# Patient Record
Sex: FEMALE | Race: WHITE | URBAN - METROPOLITAN AREA
[De-identification: names, ages, dates, MRNs, and addresses within clinical notes are randomized per-mention and may not be internally consistent; named-entity substitution may affect disease eponyms.]

---

## 2017-03-04 ENCOUNTER — OFFICE VISIT (OUTPATIENT)
Dept: URGENT CARE | Facility: URGENT CARE | Age: 77
End: 2017-03-04
Payer: COMMERCIAL

## 2017-03-04 VITALS
WEIGHT: 175.6 LBS | TEMPERATURE: 98.1 F | OXYGEN SATURATION: 91 % | BODY MASS INDEX: 36.39 KG/M2 | DIASTOLIC BLOOD PRESSURE: 90 MMHG | HEART RATE: 100 BPM | SYSTOLIC BLOOD PRESSURE: 141 MMHG

## 2017-03-04 DIAGNOSIS — M79.10 MYALGIA: Primary | ICD-10-CM

## 2017-03-04 DIAGNOSIS — J02.9 SORETHROAT: ICD-10-CM

## 2017-03-04 LAB
DEPRECATED S PYO AG THROAT QL EIA: NORMAL
FLUAV+FLUBV AG SPEC QL: ABNORMAL
FLUAV+FLUBV AG SPEC QL: NEGATIVE
MICRO REPORT STATUS: NORMAL
SPECIMEN SOURCE: ABNORMAL
SPECIMEN SOURCE: NORMAL

## 2017-03-04 PROCEDURE — 87804 INFLUENZA ASSAY W/OPTIC: CPT | Performed by: FAMILY MEDICINE

## 2017-03-04 PROCEDURE — 87081 CULTURE SCREEN ONLY: CPT | Performed by: FAMILY MEDICINE

## 2017-03-04 PROCEDURE — 87880 STREP A ASSAY W/OPTIC: CPT | Performed by: FAMILY MEDICINE

## 2017-03-04 PROCEDURE — 99202 OFFICE O/P NEW SF 15 MIN: CPT | Performed by: FAMILY MEDICINE

## 2017-03-04 RX ORDER — OSELTAMIVIR PHOSPHATE 75 MG/1
75 CAPSULE ORAL 2 TIMES DAILY
Qty: 10 CAPSULE | Refills: 0 | Status: SHIPPED | OUTPATIENT
Start: 2017-03-04 | End: 2017-04-14

## 2017-03-04 NOTE — MR AVS SNAPSHOT
After Visit Summary   3/4/2017    Alba Phillips    MRN: [id number]           Patient Information     Date Of Birth          1940        Visit Information        Provider Department      3/4/2017 11:30 AM Syed Vargas MD [facility]        Today's Diagnoses     Myalgia    -  1    Sorethroat           Follow-ups after your visit        Who to contact     If you have questions or need follow up information about today's clinic visit or your schedule please contact [facility] directly at [phone].  Normal or non-critical lab and imaging results will be communicated to you by "[employer]"hart, letter or phone within 4 business days after the clinic has received the results. If you do not hear from us within 7 days, please contact the clinic through Fastlyt or phone. If you have a critical or abnormal lab result, we will notify you by phone as soon as possible.  Submit refill requests through Amadesa or call your pharmacy and they will forward the refill request to us. Please allow 3 business days for your refill to be completed.          Additional Information About Your Visit        MyChart Information     Amadesa gives you secure access to your electronic health record. If you see a primary care provider, you can also send messages to your care team and make appointments. If you have questions, please call your primary care clinic.  If you do not have a primary care provider, please call [phone] and they will assist you.        Care EveryWhere ID     This is your Care EveryWhere ID. This could be used by other organizations to access your [place] medical records  [id number]        Your Vitals Were     Pulse Temperature Pulse Oximetry BMI (Body Mass Index)          100 98.1  F (36.7  C) (Oral) 91% 36.39 kg/m2         Blood Pressure from Last 3 Encounters:   03/04/17 141/90   05/11/11 138/84   03/17/11 134/82    Weight from Last 3 Encounters:   03/04/17  175 lb 9.6 oz (79.7 kg)   05/11/11 185 lb (83.9 kg)   03/17/11 181 lb (82.1 kg)              We Performed the Following     Beta strep group A culture     Influenza A/B antigen     Rapid strep screen          Today's Medication Changes          These changes are accurate as of: 3/4/17 11:59 PM.  If you have any questions, ask your nurse or doctor.               Start taking these medicines.        Dose/Directions    * oseltamivir 75 MG capsule   Commonly known as:  TAMIFLU   Used for:  Myalgia, Sorethroat   Started by:  Syed Vargas MD        Dose:  75 mg   Take 1 capsule (75 mg) by mouth 2 times daily   Quantity:  10 capsule   Refills:  0       * oseltamivir 75 MG capsule   Commonly known as:  TAMIFLU   Used for:  Myalgia, Sorethroat   Started by:  Syed Vargas MD        Dose:  75 mg   Take 1 capsule (75 mg) by mouth 2 times daily   Quantity:  10 capsule   Refills:  0       * Notice:  This list has 2 medication(s) that are the same as other medications prescribed for you. Read the directions carefully, and ask your doctor or other care provider to review them with you.         Where to get your medicines      These medications were sent to [facility] [id number] [place] [address]  [address] [facility] [address]     Phone:  [phone]     oseltamivir 75 MG capsule         Some of these will need a paper prescription and others can be bought over the counter.  Ask your nurse if you have questions.     Bring a paper prescription for each of these medications     oseltamivir 75 MG capsule                Primary Care Provider Office Phone # Fax #    Daina Desouza [phone]       [address] [address]        Thank you!     Thank you for choosing [facility]  for your care. Our goal is always to provide you with excellent care. Hearing back from our patients is one way we can continue to improve our services.  Please take a few minutes to complete the written survey that you may receive in the mail after your visit with us. Thank you!             Your Updated Medication List - Protect others around you: Learn how to safely use, store and throw away your medicines at www.disposemymeds.org.          This list is accurate as of: 3/4/17 11:59 PM.  Always use your most recent med list.                   Brand Name Dispense Instructions for use    aspirin 81 MG tablet      Take 1 tablet by mouth 3 times daily as needed.       fluticasone 50 MCG/ACT spray    FLONASE    1 Package    2 sprays by Both Nostrils route daily.       * oseltamivir 75 MG capsule    TAMIFLU    10 capsule    Take 1 capsule (75 mg) by mouth 2 times daily       * oseltamivir 75 MG capsule    TAMIFLU    10 capsule    Take 1 capsule (75 mg) by mouth 2 times daily       * Notice:  This list has 2 medication(s) that are the same as other medications prescribed for you. Read the directions carefully, and ask your doctor or other care provider to review them with you.

## 2017-03-04 NOTE — PROGRESS NOTES
SUBJECTIVE:                                                    Alba Phillips is a 76 year old female who presents to clinic today for the following health issues:      RESPIRATORY SYMPTOMS      Duration: x3 days    Description  rhinorrhea, sore throat, cough and headache    Severity: severe    Accompanying signs and symptoms: None    History (predisposing factors):  asthma    Precipitating or alleviating factors: None    Therapies tried and outcome:  guaifenesin          OBJECTIVE:  She appears well, vital signs are as noted by the nurse. Ears normal.  Throat and pharynx normal.  Neck supple. No adenopathy in the neck. Nose is congested. Sinuses non tender. The chest is clear, without wheezes or rales.    ASSESSMENT:   Influenza    PLAN:  Symptomatic therapy suggested: push fluids and rest. Call or return to clinic prn if these symptoms worsen or fail to improve as anticipated.

## 2017-03-04 NOTE — NURSING NOTE
"Chief Complaint   Patient presents with     URI     Flu Sx, Congestion, Nasal, HA x3 days, Bodyache, sore throat       Initial /90 (BP Location: Right arm, Patient Position: Chair, Cuff Size: Adult Regular)  Pulse 100  Temp 98.1  F (36.7  C) (Oral)  Wt 175 lb 9.6 oz (79.7 kg)  SpO2 91%  BMI 36.39 kg/m2 Estimated body mass index is 36.39 kg/(m^2) as calculated from the following:    Height as of 5/11/11: 4' 10.25\" (1.48 m).    Weight as of this encounter: 175 lb 9.6 oz (79.7 kg).  Medication Reconciliation: complete     Maggy Thomas CMA (AAMA)        "

## 2017-03-06 LAB
BACTERIA SPEC CULT: NORMAL
MICRO REPORT STATUS: NORMAL
SPECIMEN SOURCE: NORMAL

## 2017-03-20 ENCOUNTER — EXTERNAL ORDER RESULTS (OUTPATIENT)
Dept: SURGERY | Facility: CLINIC | Age: 77
End: 2017-03-20

## 2017-03-30 ENCOUNTER — OFFICE VISIT (OUTPATIENT)
Dept: SURGERY | Facility: CLINIC | Age: 77
End: 2017-03-30
Payer: COMMERCIAL

## 2017-03-30 VITALS
DIASTOLIC BLOOD PRESSURE: 90 MMHG | BODY MASS INDEX: 33.38 KG/M2 | TEMPERATURE: 100 F | OXYGEN SATURATION: 92 % | HEIGHT: 60 IN | WEIGHT: 170 LBS | SYSTOLIC BLOOD PRESSURE: 154 MMHG

## 2017-03-30 DIAGNOSIS — K43.2 INCISIONAL HERNIA, WITHOUT OBSTRUCTION OR GANGRENE: Primary | ICD-10-CM

## 2017-03-30 PROCEDURE — 99203 OFFICE O/P NEW LOW 30 MIN: CPT | Performed by: SURGERY

## 2017-03-30 ASSESSMENT — ENCOUNTER SYMPTOMS
COUGH: 1
CONSTIPATION: 1
ABDOMINAL PAIN: 1

## 2017-03-30 NOTE — LETTER
2017    RE:  Alba Chowdaryer-:  [birth year]    Alba is a 76 year old White female who presents for hernia evaluation. The patient has noticed a bulge. Pain has been present. Symptoms began 3 years weeks ago. Symptoms are described as aching and pressure located in the periumbilical region. Associated with this is none. Pt has had previous ABD surgery including NOBLE with BSO. Patient does report that increased activity/lifting causes pain. Employment does not require lifting.  Her original surgery was an abdominal hysterectomy. She had a hernia repair at this site about three years ago. Review of the operative note reports a closure with sutures of a 6 cm defect, no mesh was used.     ConstipationNo  DysuriaNo  CoughYes, chronic cough due to chronic sinus problems  SmokingNo  DiabetesNo     Pt's chart has been reviewed for PMH, PSH, allergies, medications and social history.     ROS:  Pulm: No shortness of breath, dyspnea on exertion, cough, or hemoptysis  CV: negative  ABD: See chief complaint  : negative     Physical exam:   Body mass index is 33.2 kg/(m^2).  Patient able to get up on table with mild difficulty.  Head eyes, nose and mouth within normal limits.  Sclera are clear  No supraclavicular or cervical adenopathy noted.  Neck shows no gross mass  Respirations are regular and non labored  Abdomen is abdomen is soft without significant tenderness, masses, organomegaly or guarding bowel sounds are positive and no caput medusa noted.  Hernia is noted in the lower epigastrium beginning at the level of the umbilicus and extending cephalad several centimeters. There is a clearly defined right muscular border, on the left, its less well-defined. It is moderately tender and appears to be at least partially reducible.     Imaging CT Yes, imaging review shows multiple defect hernia with the largest defect being slightly under 10 cm in width bowel is involved in the hernia. There is no inflammation. I  reviewed the scan with the patient.        Assesment: upper ventral wall incisional hernia, recurrent      Plan: Discussed observation, external support, possible progression, incarceration and strangulation signs and symptoms and need for immediate treatment if they develop.  Discussed surgery in detail, including risk, benefits, complications, incision/cosmetics, mesh, infection (possibly requiring removal of the mesh), chronic pain, involvement of inta-abdominal organs, lifting and activity limits after surgery. Gave literature to review.  We also discussed twice with a retro-muscular mesh. We discussed the possible need for component separation however, based on her CT scan, lateral musculature appears lax enough that primary closure could be achieved without component separation. I did however discuss this potential option with her. She will be taking care of her sinus infection and dental problem prior to undergoing hernia repair. She lives in [place] but has a daughter who lives in town here in the [place] area Will schedule surgery in near future     Chuy Linder MD

## 2017-03-30 NOTE — PROGRESS NOTES
Alba is a 76 year old White female who presents for hernia evaluation. The patient has noticed a bulge. Pain has been present.  Symptoms began 3 years weeks ago.  Symptoms are described as aching and pressure  located in the periumbilical region.  Associated with this  is none.  Pt has had previous ABD surgery including NOBLE with BSO.  Patient does report that increased activity/lifting causes pain. Employment does not require lifting.  Her original surgery was an abdominal hysterectomy.  She had a hernia repair at this site about three years ago.  Review of the operative note reports a closure with sutures of a 6 cm defect, no mesh was used.    ConstipationNo  DysuriaNo  CoughYes, chronic cough due to chronic sinus problems  SmokingNo  DiabetesNo    Pt's chart has been reviewed for PMH, PSH, allergies, medications and social history.    ROS:  Pulm:  No shortness of breath, dyspnea on exertion, cough, or hemoptysis  CV:  negative  ABD:  See chief complaint  :  negative    Physical exam:   Body mass index is 33.2 kg/(m^2).  Patient able to get up on table with mild difficulty.  Head eyes, nose and mouth within normal limits.  Sclera are clear  No supraclavicular or cervical adenopathy noted.  Neck shows no gross mass  Respirations are regular and non labored  Abdomen is abdomen is soft without significant tenderness, masses, organomegaly or guarding  bowel sounds are positive and no caput medusa noted.  Hernia  is noted in the lower epigastrium beginning at the level of the umbilicus and extending cephalad several centimeters.  There is a clearly defined right muscular border, on the left, its less well-defined.  It is moderately tender and appears to be at least partially reducible.    Imaging  CT Yes, imaging review shows multiple defect hernia with the largest defect being slightly under 10 cm in width bowel is involved in the hernia.  There is no inflammation.  I reviewed the scan with the  patient.      Assesment: upper ventral wall incisional hernia, recurrent     Plan: Discussed observation, external support, possible progression, incarceration and strangulation signs and symptoms and need for immediate treatment if they develop.  Discussed surgery in detail, including risk, benefits, complications, incision/cosmetics, mesh, infection (possibly requiring removal of the mesh), chronic pain, involvement of inta-abdominal organs, lifting and activity limits after surgery. Gave literature to review.  We also discussed twice with a retro-muscular mesh.  We discussed the possible need for component separation however, based on her CT scan, lateral musculature appears lax enough that primary closure could be achieved without component separation.  I did however discuss this potential option with her.  She will be taking care of her sinus infection and dental problem prior to undergoing hernia repair.  She lives in [place] but has a daughter who lives in town here in the [place] area Will schedule surgery in near future  Time spent with the patient with greater that 50% of the time in discussion was 30 minutes.    Chuy Linder MD  3/30/2017 4:07 PM    Please route or send letter to:  Primary Care Provider (PCP) and Include Progress Note

## 2017-03-30 NOTE — PROGRESS NOTES
HPI      ROS (Review of Systems):     Respiratory: Positive for cough, asthma and dyspnea.    Cardiovascular: Positive for heart disease, hypertension and hyperlipidemia.   GASTROINTESTINAL: Positive for abdominal pain and constipation.   MUSCULOSKELETAL: Positive for back pain.          Physical Exam

## 2017-03-30 NOTE — MR AVS SNAPSHOT
After Visit Summary   3/30/2017    Alba Phillips    MRN: [id number]           Patient Information     Date Of Birth          1940        Visit Information        Provider Department      3/30/2017 2:45 PM Chuy Linder MD [facility]      Today's Diagnoses     Incisional hernia, without obstruction or gangrene    -  1       Follow-ups after your visit        Who to contact     If you have questions or need follow up information about today's clinic visit or your schedule please contact [facility] directly at [phone].  Normal or non-critical lab and imaging results will be communicated to you by [employer]hart, letter or phone within 4 business days after the clinic has received the results. If you do not hear from us within 7 days, please contact the clinic through Language Cloud[employer] or phone. If you have a critical or abnormal lab result, we will notify you by phone as soon as possible.  Submit refill requests through [email or web address] or call your pharmacy and they will forward the refill request to us. Please allow 3 business days for your refill to be completed.          Additional Information About Your Visit        MyChart Information     [email or web address] gives you secure access to your electronic health record. If you see a primary care provider, you can also send messages to your care team and make appointments. If you have questions, please call your primary care clinic.  If you do not have a primary care provider, please call [phone] and they will assist you.        Care EveryWhere ID     This is your Care EveryWhere ID. This could be used by other organizations to access your [place] medical records  [id number]        Your Vitals Were     Temperature Height Pulse Oximetry BMI (Body Mass Index)          100  F (37.8  C) 5' (1.524 m) 92% 33.2 kg/m2         Blood Pressure from Last 3 Encounters:   03/30/17 154/90   03/04/17  141/90   05/11/11 138/84    Weight from Last 3 Encounters:   03/30/17 170 lb (77.1 kg)   03/04/17 175 lb 9.6 oz (79.7 kg)   05/11/11 185 lb (83.9 kg)              Today, you had the following     No orders found for display       Primary Care Provider Office Phone # Fax #    Daina Desouza [phone]       [address] [address]        Thank you!     Thank you for choosing [facility]  for your care. Our goal is always to provide you with excellent care. Hearing back from our patients is one way we can continue to improve our services. Please take a few minutes to complete the written survey that you may receive in the mail after your visit with us. Thank you!             Your Updated Medication List - Protect others around you: Learn how to safely use, store and throw away your medicines at www.disposemymeds.org.          This list is accurate as of: 3/30/17  4:14 PM.  Always use your most recent med list.                   Brand Name Dispense Instructions for use    aspirin 81 MG tablet      Take 1 tablet by mouth 3 times daily as needed.       fluticasone 50 MCG/ACT spray    FLONASE    1 Package    2 sprays by Both Nostrils route daily.       * oseltamivir 75 MG capsule    TAMIFLU    10 capsule    Take 1 capsule (75 mg) by mouth 2 times daily       * oseltamivir 75 MG capsule    TAMIFLU    10 capsule    Take 1 capsule (75 mg) by mouth 2 times daily       * Notice:  This list has 2 medication(s) that are the same as other medications prescribed for you. Read the directions carefully, and ask your doctor or other care provider to review them with you.

## 2017-04-14 RX ORDER — FLUTICASONE PROPIONATE 50 MCG
2 SPRAY, SUSPENSION (ML) NASAL DAILY PRN
COMMUNITY

## 2017-04-21 ENCOUNTER — APPOINTMENT (OUTPATIENT)
Dept: SURGERY | Facility: PHYSICIAN GROUP | Age: 77
End: 2017-04-21
Payer: COMMERCIAL

## 2017-04-21 ENCOUNTER — HOSPITAL ENCOUNTER (INPATIENT)
Facility: CLINIC | Age: 77
LOS: 3 days | Discharge: HOME | DRG: 354 | End: 2017-04-24
Attending: SURGERY | Admitting: SURGERY
Payer: MEDICARE

## 2017-04-21 ENCOUNTER — ANESTHESIA EVENT (OUTPATIENT)
Dept: SURGERY | Facility: CLINIC | Age: 77
DRG: 354 | End: 2017-04-21
Payer: MEDICARE

## 2017-04-21 ENCOUNTER — ANESTHESIA (OUTPATIENT)
Dept: SURGERY | Facility: CLINIC | Age: 77
DRG: 354 | End: 2017-04-21
Payer: MEDICARE

## 2017-04-21 DIAGNOSIS — K43.0 INCISIONAL HERNIA, INCARCERATED: Primary | ICD-10-CM

## 2017-04-21 LAB — GLUCOSE BLDC GLUCOMTR-MCNC: 140 MG/DL (ref 70–99)

## 2017-04-21 PROCEDURE — 15734 MUSCLE-SKIN GRAFT TRUNK: CPT | Mod: AS | Performed by: PHYSICIAN ASSISTANT

## 2017-04-21 PROCEDURE — 37000008 ZZH ANESTHESIA TECHNICAL FEE, 1ST 30 MIN: Performed by: SURGERY

## 2017-04-21 PROCEDURE — 49568 ZZHC REPAIR HERNIA WITH MESH: CPT | Mod: AS | Performed by: PHYSICIAN ASSISTANT

## 2017-04-21 PROCEDURE — 27210794 ZZH OR GENERAL SUPPLY STERILE: Performed by: SURGERY

## 2017-04-21 PROCEDURE — 25000125 ZZHC RX 250: Performed by: NURSE ANESTHETIST, CERTIFIED REGISTERED

## 2017-04-21 PROCEDURE — 00000146 ZZHCL STATISTIC GLUCOSE BY METER IP

## 2017-04-21 PROCEDURE — 25800025 ZZH RX 258: Performed by: NURSE ANESTHETIST, CERTIFIED REGISTERED

## 2017-04-21 PROCEDURE — 37000009 ZZH ANESTHESIA TECHNICAL FEE, EACH ADDTL 15 MIN: Performed by: SURGERY

## 2017-04-21 PROCEDURE — 25000125 ZZHC RX 250: Performed by: SURGERY

## 2017-04-21 PROCEDURE — 49566 ZZHC REPAIR RECURR INCIS HERNIA,STRANG: CPT | Mod: AS | Performed by: PHYSICIAN ASSISTANT

## 2017-04-21 PROCEDURE — 49566 ZZHC REPAIR RECURR INCIS HERNIA,STRANG: CPT | Performed by: SURGERY

## 2017-04-21 PROCEDURE — 71000012 ZZH RECOVERY PHASE 1 LEVEL 1 FIRST HR: Performed by: SURGERY

## 2017-04-21 PROCEDURE — C1781 MESH (IMPLANTABLE): HCPCS | Performed by: SURGERY

## 2017-04-21 PROCEDURE — 12000007 ZZH R&B INTERMEDIATE

## 2017-04-21 PROCEDURE — 49568 ZZHC REPAIR HERNIA WITH MESH: CPT | Performed by: SURGERY

## 2017-04-21 PROCEDURE — 25000128 H RX IP 250 OP 636: Performed by: NURSE ANESTHETIST, CERTIFIED REGISTERED

## 2017-04-21 PROCEDURE — C1765 ADHESION BARRIER: HCPCS | Performed by: SURGERY

## 2017-04-21 PROCEDURE — 15734 MUSCLE-SKIN GRAFT TRUNK: CPT | Mod: 59 | Performed by: SURGERY

## 2017-04-21 PROCEDURE — 25000132 ZZH RX MED GY IP 250 OP 250 PS 637: Mod: GY | Performed by: SURGERY

## 2017-04-21 PROCEDURE — 25000128 H RX IP 250 OP 636: Performed by: SURGERY

## 2017-04-21 PROCEDURE — 36000050 ZZH SURGERY LEVEL 2 1ST 30 MIN: Performed by: SURGERY

## 2017-04-21 PROCEDURE — 40000306 ZZH STATISTIC PRE PROC ASSESS II: Performed by: SURGERY

## 2017-04-21 PROCEDURE — A9270 NON-COVERED ITEM OR SERVICE: HCPCS | Mod: GY | Performed by: SURGERY

## 2017-04-21 PROCEDURE — 0WUF0JZ SUPPLEMENT ABDOMINAL WALL WITH SYNTHETIC SUBSTITUTE, OPEN APPROACH: ICD-10-PCS | Performed by: SURGERY

## 2017-04-21 PROCEDURE — 36000052 ZZH SURGERY LEVEL 2 EA 15 ADDTL MIN: Performed by: SURGERY

## 2017-04-21 PROCEDURE — 25000125 ZZHC RX 250: Performed by: ANESTHESIOLOGY

## 2017-04-21 PROCEDURE — 25000566 ZZH SEVOFLURANE, EA 15 MIN: Performed by: SURGERY

## 2017-04-21 PROCEDURE — 25800025 ZZH RX 258: Performed by: ANESTHESIOLOGY

## 2017-04-21 DEVICE — MESH VENTRALIGHT ST SYSTEM 6X8" ELLIPSE 5954680: Type: IMPLANTABLE DEVICE | Status: FUNCTIONAL

## 2017-04-21 RX ORDER — MEPERIDINE HYDROCHLORIDE 25 MG/ML
12.5 INJECTION INTRAMUSCULAR; INTRAVENOUS; SUBCUTANEOUS EVERY 5 MIN PRN
Status: DISCONTINUED | OUTPATIENT
Start: 2017-04-21 | End: 2017-04-21 | Stop reason: HOSPADM

## 2017-04-21 RX ORDER — CEFAZOLIN SODIUM 1 G/3ML
1 INJECTION, POWDER, FOR SOLUTION INTRAMUSCULAR; INTRAVENOUS SEE ADMIN INSTRUCTIONS
Status: DISCONTINUED | OUTPATIENT
Start: 2017-04-21 | End: 2017-04-21 | Stop reason: HOSPADM

## 2017-04-21 RX ORDER — ONDANSETRON 2 MG/ML
4 INJECTION INTRAMUSCULAR; INTRAVENOUS EVERY 6 HOURS PRN
Status: DISCONTINUED | OUTPATIENT
Start: 2017-04-21 | End: 2017-04-24 | Stop reason: HOSPADM

## 2017-04-21 RX ORDER — OXYCODONE HYDROCHLORIDE 5 MG/1
5 TABLET ORAL
Status: DISCONTINUED | OUTPATIENT
Start: 2017-04-21 | End: 2017-04-24 | Stop reason: HOSPADM

## 2017-04-21 RX ORDER — ONDANSETRON 2 MG/ML
4 INJECTION INTRAMUSCULAR; INTRAVENOUS EVERY 30 MIN PRN
Status: DISCONTINUED | OUTPATIENT
Start: 2017-04-21 | End: 2017-04-21 | Stop reason: HOSPADM

## 2017-04-21 RX ORDER — SODIUM CHLORIDE, SODIUM LACTATE, POTASSIUM CHLORIDE, CALCIUM CHLORIDE 600; 310; 30; 20 MG/100ML; MG/100ML; MG/100ML; MG/100ML
INJECTION, SOLUTION INTRAVENOUS CONTINUOUS
Status: DISCONTINUED | OUTPATIENT
Start: 2017-04-21 | End: 2017-04-21 | Stop reason: HOSPADM

## 2017-04-21 RX ORDER — KETOROLAC TROMETHAMINE 30 MG/ML
INJECTION, SOLUTION INTRAMUSCULAR; INTRAVENOUS PRN
Status: DISCONTINUED | OUTPATIENT
Start: 2017-04-21 | End: 2017-04-21

## 2017-04-21 RX ORDER — ONDANSETRON 4 MG/1
4 TABLET, ORALLY DISINTEGRATING ORAL EVERY 30 MIN PRN
Status: DISCONTINUED | OUTPATIENT
Start: 2017-04-21 | End: 2017-04-21 | Stop reason: HOSPADM

## 2017-04-21 RX ORDER — LIDOCAINE 40 MG/G
CREAM TOPICAL
Status: DISCONTINUED | OUTPATIENT
Start: 2017-04-21 | End: 2017-04-21 | Stop reason: HOSPADM

## 2017-04-21 RX ORDER — LABETALOL HYDROCHLORIDE 5 MG/ML
10 INJECTION, SOLUTION INTRAVENOUS
Status: DISCONTINUED | OUTPATIENT
Start: 2017-04-21 | End: 2017-04-21 | Stop reason: HOSPADM

## 2017-04-21 RX ORDER — DEXAMETHASONE SODIUM PHOSPHATE 4 MG/ML
INJECTION, SOLUTION INTRA-ARTICULAR; INTRALESIONAL; INTRAMUSCULAR; INTRAVENOUS; SOFT TISSUE PRN
Status: DISCONTINUED | OUTPATIENT
Start: 2017-04-21 | End: 2017-04-21

## 2017-04-21 RX ORDER — LIDOCAINE HYDROCHLORIDE 10 MG/ML
INJECTION, SOLUTION INFILTRATION; PERINEURAL PRN
Status: DISCONTINUED | OUTPATIENT
Start: 2017-04-21 | End: 2017-04-21

## 2017-04-21 RX ORDER — SODIUM CHLORIDE 9 MG/ML
INJECTION, SOLUTION INTRAVENOUS CONTINUOUS
Status: DISCONTINUED | OUTPATIENT
Start: 2017-04-21 | End: 2017-04-24 | Stop reason: HOSPADM

## 2017-04-21 RX ORDER — ONDANSETRON 4 MG/1
4 TABLET, ORALLY DISINTEGRATING ORAL EVERY 6 HOURS PRN
Status: DISCONTINUED | OUTPATIENT
Start: 2017-04-21 | End: 2017-04-24 | Stop reason: HOSPADM

## 2017-04-21 RX ORDER — DROPERIDOL 2.5 MG/ML
0.62 INJECTION, SOLUTION INTRAMUSCULAR; INTRAVENOUS
Status: DISCONTINUED | OUTPATIENT
Start: 2017-04-21 | End: 2017-04-21 | Stop reason: RX

## 2017-04-21 RX ORDER — DIAZEPAM 10 MG/2ML
2.5 INJECTION, SOLUTION INTRAMUSCULAR; INTRAVENOUS
Status: DISCONTINUED | OUTPATIENT
Start: 2017-04-21 | End: 2017-04-21 | Stop reason: HOSPADM

## 2017-04-21 RX ORDER — MORPHINE SULFATE 2 MG/ML
1-4 INJECTION, SOLUTION INTRAMUSCULAR; INTRAVENOUS
Status: DISCONTINUED | OUTPATIENT
Start: 2017-04-21 | End: 2017-04-24 | Stop reason: HOSPADM

## 2017-04-21 RX ORDER — ACETAMINOPHEN 325 MG/1
650 TABLET ORAL EVERY 6 HOURS PRN
Status: DISCONTINUED | OUTPATIENT
Start: 2017-04-21 | End: 2017-04-24 | Stop reason: HOSPADM

## 2017-04-21 RX ORDER — ALBUTEROL SULFATE 0.83 MG/ML
2.5 SOLUTION RESPIRATORY (INHALATION) EVERY 4 HOURS PRN
Status: DISCONTINUED | OUTPATIENT
Start: 2017-04-21 | End: 2017-04-21 | Stop reason: HOSPADM

## 2017-04-21 RX ORDER — PROPOFOL 10 MG/ML
INJECTION, EMULSION INTRAVENOUS CONTINUOUS PRN
Status: DISCONTINUED | OUTPATIENT
Start: 2017-04-21 | End: 2017-04-21

## 2017-04-21 RX ORDER — FLUTICASONE PROPIONATE 50 MCG
2 SPRAY, SUSPENSION (ML) NASAL DAILY PRN
Status: DISCONTINUED | OUTPATIENT
Start: 2017-04-21 | End: 2017-04-24 | Stop reason: HOSPADM

## 2017-04-21 RX ORDER — NEOSTIGMINE METHYLSULFATE 1 MG/ML
VIAL (ML) INJECTION PRN
Status: DISCONTINUED | OUTPATIENT
Start: 2017-04-21 | End: 2017-04-21

## 2017-04-21 RX ORDER — OXYCODONE HYDROCHLORIDE 5 MG/1
5-10 TABLET ORAL
Qty: 30 TABLET | Refills: 0 | Status: SHIPPED | OUTPATIENT
Start: 2017-04-21 | End: 2017-05-03

## 2017-04-21 RX ORDER — FENTANYL CITRATE 50 UG/ML
INJECTION, SOLUTION INTRAMUSCULAR; INTRAVENOUS PRN
Status: DISCONTINUED | OUTPATIENT
Start: 2017-04-21 | End: 2017-04-21

## 2017-04-21 RX ORDER — GLYCOPYRROLATE 0.2 MG/ML
INJECTION, SOLUTION INTRAMUSCULAR; INTRAVENOUS PRN
Status: DISCONTINUED | OUTPATIENT
Start: 2017-04-21 | End: 2017-04-21

## 2017-04-21 RX ORDER — CEFAZOLIN SODIUM 2 G/100ML
2 INJECTION, SOLUTION INTRAVENOUS
Status: COMPLETED | OUTPATIENT
Start: 2017-04-21 | End: 2017-04-21

## 2017-04-21 RX ORDER — ONDANSETRON 2 MG/ML
INJECTION INTRAMUSCULAR; INTRAVENOUS PRN
Status: DISCONTINUED | OUTPATIENT
Start: 2017-04-21 | End: 2017-04-21

## 2017-04-21 RX ORDER — BUPIVACAINE HYDROCHLORIDE AND EPINEPHRINE 2.5; 5 MG/ML; UG/ML
INJECTION, SOLUTION EPIDURAL; INFILTRATION; INTRACAUDAL; PERINEURAL PRN
Status: DISCONTINUED | OUTPATIENT
Start: 2017-04-21 | End: 2017-04-21 | Stop reason: HOSPADM

## 2017-04-21 RX ORDER — GLYCINE 1.5 G/100ML
SOLUTION IRRIGATION PRN
Status: DISCONTINUED | OUTPATIENT
Start: 2017-04-21 | End: 2017-04-21 | Stop reason: HOSPADM

## 2017-04-21 RX ORDER — SODIUM CHLORIDE, SODIUM LACTATE, POTASSIUM CHLORIDE, CALCIUM CHLORIDE 600; 310; 30; 20 MG/100ML; MG/100ML; MG/100ML; MG/100ML
INJECTION, SOLUTION INTRAVENOUS CONTINUOUS PRN
Status: DISCONTINUED | OUTPATIENT
Start: 2017-04-21 | End: 2017-04-21

## 2017-04-21 RX ORDER — PROPOFOL 10 MG/ML
INJECTION, EMULSION INTRAVENOUS PRN
Status: DISCONTINUED | OUTPATIENT
Start: 2017-04-21 | End: 2017-04-21

## 2017-04-21 RX ORDER — METOPROLOL SUCCINATE 25 MG/1
25 TABLET, EXTENDED RELEASE ORAL DAILY
Status: DISCONTINUED | OUTPATIENT
Start: 2017-04-21 | End: 2017-04-24 | Stop reason: HOSPADM

## 2017-04-21 RX ORDER — NALOXONE HYDROCHLORIDE 0.4 MG/ML
.1-.4 INJECTION, SOLUTION INTRAMUSCULAR; INTRAVENOUS; SUBCUTANEOUS
Status: DISCONTINUED | OUTPATIENT
Start: 2017-04-21 | End: 2017-04-24 | Stop reason: HOSPADM

## 2017-04-21 RX ORDER — FENTANYL CITRATE 50 UG/ML
25-50 INJECTION, SOLUTION INTRAMUSCULAR; INTRAVENOUS
Status: DISCONTINUED | OUTPATIENT
Start: 2017-04-21 | End: 2017-04-21 | Stop reason: HOSPADM

## 2017-04-21 RX ORDER — ACETAMINOPHEN 10 MG/ML
1000 INJECTION, SOLUTION INTRAVENOUS ONCE
Status: COMPLETED | OUTPATIENT
Start: 2017-04-21 | End: 2017-04-21

## 2017-04-21 RX ORDER — DIMENHYDRINATE 50 MG/ML
25 INJECTION, SOLUTION INTRAMUSCULAR; INTRAVENOUS
Status: DISCONTINUED | OUTPATIENT
Start: 2017-04-21 | End: 2017-04-21 | Stop reason: HOSPADM

## 2017-04-21 RX ADMIN — FENTANYL CITRATE 100 MCG: 50 INJECTION, SOLUTION INTRAMUSCULAR; INTRAVENOUS at 09:13

## 2017-04-21 RX ADMIN — ACETAMINOPHEN 650 MG: 325 TABLET, FILM COATED ORAL at 20:57

## 2017-04-21 RX ADMIN — LIDOCAINE HYDROCHLORIDE 30 MG: 10 INJECTION, SOLUTION INFILTRATION; PERINEURAL at 09:13

## 2017-04-21 RX ADMIN — ONDANSETRON 4 MG: 2 INJECTION INTRAMUSCULAR; INTRAVENOUS at 09:13

## 2017-04-21 RX ADMIN — PHENYLEPHRINE HYDROCHLORIDE 100 MCG: 10 INJECTION, SOLUTION INTRAMUSCULAR; INTRAVENOUS; SUBCUTANEOUS at 11:12

## 2017-04-21 RX ADMIN — CEFAZOLIN SODIUM 1 G: 1 INJECTION, SOLUTION INTRAVENOUS at 17:10

## 2017-04-21 RX ADMIN — SODIUM CHLORIDE, POTASSIUM CHLORIDE, SODIUM LACTATE AND CALCIUM CHLORIDE: 600; 310; 30; 20 INJECTION, SOLUTION INTRAVENOUS at 12:09

## 2017-04-21 RX ADMIN — HYDROMORPHONE HYDROCHLORIDE 0.5 MG: 1 INJECTION, SOLUTION INTRAMUSCULAR; INTRAVENOUS; SUBCUTANEOUS at 09:13

## 2017-04-21 RX ADMIN — SODIUM CHLORIDE, POTASSIUM CHLORIDE, SODIUM LACTATE AND CALCIUM CHLORIDE: 600; 310; 30; 20 INJECTION, SOLUTION INTRAVENOUS at 09:06

## 2017-04-21 RX ADMIN — PHENYLEPHRINE HYDROCHLORIDE 100 MCG: 10 INJECTION, SOLUTION INTRAMUSCULAR; INTRAVENOUS; SUBCUTANEOUS at 09:17

## 2017-04-21 RX ADMIN — ACETAMINOPHEN 1000 MG: 10 INJECTION, SOLUTION INTRAVENOUS at 12:12

## 2017-04-21 RX ADMIN — FENTANYL CITRATE 50 MCG: 50 INJECTION, SOLUTION INTRAMUSCULAR; INTRAVENOUS at 10:39

## 2017-04-21 RX ADMIN — Medication 3 MG: at 11:35

## 2017-04-21 RX ADMIN — GLYCOPYRROLATE 0.4 MG: 0.2 INJECTION, SOLUTION INTRAMUSCULAR; INTRAVENOUS at 11:35

## 2017-04-21 RX ADMIN — KETOROLAC TROMETHAMINE 30 MG: 30 INJECTION, SOLUTION INTRAMUSCULAR at 09:13

## 2017-04-21 RX ADMIN — MIDAZOLAM HYDROCHLORIDE 1.5 MG: 1 INJECTION, SOLUTION INTRAMUSCULAR; INTRAVENOUS at 09:07

## 2017-04-21 RX ADMIN — SODIUM CHLORIDE, POTASSIUM CHLORIDE, SODIUM LACTATE AND CALCIUM CHLORIDE: 600; 310; 30; 20 INJECTION, SOLUTION INTRAVENOUS at 10:00

## 2017-04-21 RX ADMIN — PROPOFOL 25 MCG/KG/MIN: 10 INJECTION, EMULSION INTRAVENOUS at 09:30

## 2017-04-21 RX ADMIN — ROCURONIUM BROMIDE 10 MG: 10 INJECTION INTRAVENOUS at 10:30

## 2017-04-21 RX ADMIN — FENTANYL CITRATE 100 MCG: 50 INJECTION, SOLUTION INTRAMUSCULAR; INTRAVENOUS at 10:00

## 2017-04-21 RX ADMIN — PHENYLEPHRINE HYDROCHLORIDE 100 MCG: 10 INJECTION, SOLUTION INTRAMUSCULAR; INTRAVENOUS; SUBCUTANEOUS at 10:52

## 2017-04-21 RX ADMIN — PHENYLEPHRINE HYDROCHLORIDE 100 MCG: 10 INJECTION, SOLUTION INTRAMUSCULAR; INTRAVENOUS; SUBCUTANEOUS at 09:22

## 2017-04-21 RX ADMIN — DEXAMETHASONE SODIUM PHOSPHATE 8 MG: 4 INJECTION, SOLUTION INTRA-ARTICULAR; INTRALESIONAL; INTRAMUSCULAR; INTRAVENOUS; SOFT TISSUE at 09:13

## 2017-04-21 RX ADMIN — PROPOFOL 200 MG: 10 INJECTION, EMULSION INTRAVENOUS at 09:13

## 2017-04-21 RX ADMIN — CEFAZOLIN SODIUM 2 G: 2 INJECTION, SOLUTION INTRAVENOUS at 09:06

## 2017-04-21 RX ADMIN — ROCURONIUM BROMIDE 50 MG: 10 INJECTION INTRAVENOUS at 09:13

## 2017-04-21 RX ADMIN — PHENYLEPHRINE HYDROCHLORIDE 100 MCG: 10 INJECTION, SOLUTION INTRAMUSCULAR; INTRAVENOUS; SUBCUTANEOUS at 10:58

## 2017-04-21 RX ADMIN — MIDAZOLAM HYDROCHLORIDE 0.5 MG: 1 INJECTION, SOLUTION INTRAMUSCULAR; INTRAVENOUS at 09:12

## 2017-04-21 RX ADMIN — ROCURONIUM BROMIDE 10 MG: 10 INJECTION INTRAVENOUS at 10:03

## 2017-04-21 RX ADMIN — SODIUM CHLORIDE: 9 INJECTION, SOLUTION INTRAVENOUS at 13:59

## 2017-04-21 ASSESSMENT — COPD QUESTIONNAIRES
CAT_SEVERITY: MILD
COPD: 1

## 2017-04-21 NOTE — ANESTHESIA PREPROCEDURE EVALUATION
Anesthesia Evaluation     . Pt has had prior anesthetic. Type: General           ROS/MED HX    ENT/Pulmonary:     (+)sleep apnea, LAKESHIA risk factors hypertension, obese, mild COPD, , . .    Neurologic:  - neg neurologic ROS     Cardiovascular:     (+) hypertension----. : . . . :. Irregular Heartbeat/Palpitations, .       METS/Exercise Tolerance:     Hematologic:  - neg hematologic  ROS       Musculoskeletal:   (+) arthritis, , , -       GI/Hepatic:     (+) Other GI/Hepatic recurrent incisional hernia      Renal/Genitourinary:  - ROS Renal section negative       Endo: Comment: Class 1-2    (+) Obesity, .      Psychiatric:  - neg psychiatric ROS       Infectious Disease:  - neg infectious disease ROS       Malignancy:      - no malignancy   Other:    - neg other ROS                 Physical Exam  Normal systems: cardiovascular, pulmonary and dental    Airway   Mallampati: II  TM distance: >3 FB  Neck ROM: full    Dental     Cardiovascular   Rhythm and rate: regular and normal      Pulmonary    breath sounds clear to auscultation    Other findings: Lab Test        02/18/11                       0958          WBC          3.6*          HGB          15.2          MCV          88            PLT          160            Lab Test        02/18/11                       0958          NA           142           POTASSIUM    4.3           CHLORIDE     104           CO2          24            BUN          14            CR           0.67          ANIONGAP     14            JESE          9.4           GLC          102*              ECG  NSR with RBBB                Anesthesia Plan      History & Physical Review  History and physical reviewed and following examination; no interval change.    ASA Status:  3 .    NPO Status:  > 8 hours    Plan for General, ETT and LMA with Intravenous induction. Maintenance will be Balanced.    PONV prophylaxis:  Ondansetron (or other 5HT-3) and Dexamethasone or Solumedrol       Postoperative  Care  Postoperative pain management:  IV analgesics, Oral pain medications, Multi-modal analgesia and Peripheral nerve block (Single Shot).      Consents  Anesthetic plan, risks, benefits and alternatives discussed with:  Patient.  Use of blood products discussed: No .   .                          .

## 2017-04-21 NOTE — BRIEF OP NOTE
[facility] Brief Operative Note    Pre-operative diagnosis: Recurrent Incisional hernia    Post-operative diagnosis same   Procedure: Procedure(s):  Recurrant Incisional Hernia Repair with mesh and limited lysis of adhesions - Wound Class: I-Clean, bilateral myofascial advancement flaps   Surgeon(s): Surgeon(s) and Role:     * Chuy Linder MD - Primary     * Karson Fry PA-C - Assisting   Estimated blood loss: 50 mL    Specimens: * No specimens in log *   Findings: Omentum adhesed to sac

## 2017-04-21 NOTE — IP AVS SNAPSHOT
[facility] [place]    [address]    [facility] [address]    Phone:  [phone]    Fax:  [phone]                                       After Visit Summary   4/21/2017    Alba Phillips    MRN: [id number]           After Visit Summary Signature Page     I have received my discharge instructions, and my questions have been answered. I have discussed any challenges I see with this plan with the nurse or doctor.    ..........................................................................................................................................  Patient/Patient Representative Signature      ..........................................................................................................................................  Patient Representative Print Name and Relationship to Patient    ..................................................               ................................................  Date                                            Time    ..........................................................................................................................................  Reviewed by Signature/Title    ...................................................              ..............................................  Date                                                            Time

## 2017-04-21 NOTE — IP AVS SNAPSHOT
MRN:[id number]                      After Visit Summary   4/21/2017    Alba hPillips    MRN: [id number]           Thank you!     Thank you for choosing [facility] for your care. Our goal is always to provide you with excellent care. Hearing back from our patients is one way we can continue to improve our services. Please take a few minutes to complete the written survey that you may receive in the mail after you visit. If you would like to speak to someone directly about your visit please contact Patient Relations at [phone]. Thank you!          Patient Information     Date Of Birth          1940        Designated Caregiver       Most Recent Value    Caregiver    Will someone help with your care after discharge? yes    Name of designated caregiver Felicia Jackson (daughter)    Phone number of caregiver [phone]    Caregiver address pt doesn't know the address at this time      About your hospital stay     You were admitted on:  April 21, 2017 You last received care in the:  [facility]    You were discharged on:  April 24, 2017       Who to Call     For medical emergencies, please call 911.  For non-urgent questions about your medical care, please call your primary care provider or clinic, None  For questions related to your surgery, please call your surgery clinic        Attending Provider     Provider Specialty    Chuy Linder MD General Surgery       Primary Care Provider    [place] [id number]        Further instructions from your care team       HOME CARE FOLLOWING ABDOMINAL SURGERY  LITA Gil E. Gavin, N. Guttormson, D. Maurer, R. O Donnell, L. Thomas     Special instructions for Alba Phillips:  --call and return to clinic for drain removal when output is less than 30cc/24 hours per drain.   [phone]         INCISIONAL CARE:  Replace the bandage over your incision (or  incisions) until all drainage stops, or if more comfortable to have in place.  If present, leave the steri-strips (white paper tapes) in place for 14 days after surgery.  If you have staples in your incision at the time of discharge, they will be removed at your follow-up appointment.  If Dermabond (a type of skin glue) is present, leave in place until it wears/flakes off.     BATHING:  Avoid baths for 1 week after surgery.  Showers are okay.  You may wash your hair at any time.  Gently pat your incision dry after bathing.    ACTIVITY:  Light Activity -- you may immediately be up and about as tolerated.  Driving -- you may drive when comfortable and off narcotic pain medications.  Light Work -- resume when comfortable off pain medications.  (If you can drive, you probably can work.)  Strenuous Work/Activity -- limit lifting to 20 pounds for 6 weeks.  Then, progressively increase with time.  Active Sports (running, biking, etc.) -- cautiously resume after 6 weeks.    DISCOMFORT:  Use pain medications as prescribed by your surgeon.  Take the pain medication with some food, when possible, to minimize side effects.  Expect gradual improvement.    DIET:  Return to diet you were on before surgery, unless you are given specific diet instructions.  Drink plenty of fluids.  While taking pain medications, increase dietary fiber or add a fiber supplementation like Metamucil or Citrucel to help prevent constipation - a possible side effect of pain medications.    NAUSEA:  If nauseated from the anesthetic/pain meds; rest in bed, get up cautiously with assistance, and drink clear liquids (juice, tea, broth).    RETURN APPOINTMENT:  Schedule a follow-up visit 2-3 weeks after discharge from the hospital.  Office Phone:  [phone]     CONTACT US IF THE FOLLOWING DEVELOPS:   1. A fever that is above 101     2. If there is a large amount of drainage, bleeding, or swelling.   3. Severe pain that is not relieved by your  prescription.   4. Drainage that is thick, cloudy, yellow, green or white.   5. Any other questions not answered by  Frequently Asked Questions  sheet.      FREQUENTLY ASKED QUESTIONS:    Q:  How should my incision look?    A:  Normally your incision will appear slightly swollen with light redness directly along the incision itself as it heals.  It may feel like a bump or ridge as the healing/scarring happens, and over time (3-4 months) this bump or ridge feeling should slowly go away.  In general, clear or pink watery drainage can be normal at first as your incision heals, but should decrease over time.    Q:  How do I know if my incision is infected?  A:  Look at your incision for signs of infection, like redness around the incision spreading to surrounding skin, or drainage of cloudy or foul-smelling drainage.  If you feel warm, check your temperature to see if you are running a fever.    **If any of these things occur, please notify the nurse at our office.  We may need you to come into the office for an incision check.      Q:  How do I take care of my incision?  A:  If you have a dressing in place - Starting the day after surgery, replace the dressing 1-2 times a day until there is no further drainage from the incision.  At that time, a dressing is no longer needed.  Try to minimize tape on the skin if irritation is occurring at the tape sites.  If you have significant irritation from tape on the skin, please call the office to discuss other method of dressing your incision.    Small pieces of tape called  steri-strips  may be present directly overlying your incision; these may be removed 10 days after surgery unless otherwise specified by your surgeon.  If these tapes start to loosen at the ends, you may trim them back until they fall off or are removed.    A:  If you had  Dermabond  tissue glue used as a dressing (this causes your incision to look shiny with a clear covering over it) - This type of dressing  wears off with time and does not require more dressings over the top unless it is draining around the glue as it wears off.  Do not apply ointments or lotions over the incisions until the glue has completely worn off.    Q:  There is a piece of tape or a sticky  lead  still on my skin.  Can I remove this?  A:  Sometimes the sticky  leads  used for monitoring during surgery or for evaluation in the emergency department are not all removed while you are in the hospital.  These sometimes have a tab or metal dot on them.  You can easily remove these on your own, like taking off a band-aid.  If there is a gel substance under the  lead , simply wipe/clean it off with a washcloth or paper towel.      Q:  What can I do to minimize constipation (very hard stools, or lack of stools)?  A:  Stay well hydrated.  Increase your dietary fiber intake or take a fiber supplement -with plenty of water.  Walk around frequently.  You may consider an over-the-counter stool-softener.  Your Pharmacist can assist you with choosing one that is stocked at your pharmacy.  Constipation is also one of the most common side effects of pain medication.  If you are using pain medication, be pro-active and try to PREVENT problems with constipation by taking the steps above BEFORE constipation becomes a problem.    Q:  What do I do if I need more pain medications?  A:  Call the office to receive refills.  Be aware that certain pain meds cannot be called into a pharmacy and actually require a paper prescription.  A change may be made in your pain med as you progress thru your recovery period or if you have side effects to certain meds.    --Pain meds are NOT refilled after 5pm on weekdays, and NOT AT ALL on the weekends, so please look ahead to prevent problems.      Q:  Why am I having a hard time sleeping now that I am at home?  A:  Many medications you receive while you are in the hospital can impact your sleep for a number of days after your  surgery/hospitalization.  Decreased level of activity and naps during the day may also make sleeping at night difficult.  Try to minimize day-time naps, and get up frequently during the day to walk around your home during your recovery time.  Sleep aides may be of some help, but are not recommended for long-term use.      Q:  I am having some back discomfort.  What should I do?  A:  This may be related to certain positioning that was required for your surgery, extended periods of time in bed, or other changes in your overall activity level.  You may try ice, heat, acetaminophen, or ibuprofen to treat this temporarily.  Note that many pain medications have acetaminophen in them and would state this on the prescription bottle.  Be sure not to exceed the maximum of 4000mg per day of acetaminophen.     **If the pain you are having does not resolve, is severe, or is a flare of back pain you have had on other occasions prior to surgery, please contact your primary physician for further recommendations or for an appointment to be examined at their office.    Q:  Why am I having headaches?  A:  Headaches can be caused by many things:  caffeine withdrawal, use of pain meds, dehydration, high blood pressure, lack of sleep, over-activity/exhaustion, flare-up of usual migraine headaches.  If you feel this is related to muscle tension (a band-like feeling around the head, or a pressure at the low-back of the head) you may try ice or heat to this area.  You may need to drink more fluids (try electrolyte drink like Gatorade), rest, or take your usual migraine medications.   **If your headaches do not resolve, worsen, are accompanied by other symptoms, or if your blood pressure is high, please call your primary physician for recommendation and/or examination.    Q:  I am unable to urinate.  What do I do?  A:  A small percentage of people can have difficulty urinating initially after surgery.  This includes being able to urinate  only a very small amount at a time and feeling discomfort or pressure in the very low abdomen.  This is called  urinary retention , and is actually an urgent situation.  Proceed to your nearest Emergency department for evaluation (not an Urgent Care Center).  Sometimes the bladder does not work correctly after certain medications you receive during surgery, or related to certain procedures.  You may need to have a catheter placed until your bladder recovers.  When planning to go to an Emergency department, it may help to call the ER to let them know you are coming in for this problem after a surgery.  This may help you get in quicker to be evaluated.  **If you have symptoms of a urinary tract infection, please contact your primary physician for the proper evaluation and treatment.          If you have other questions, please call the office Monday thru Friday between 8am and 5pm to discuss with the nurse or physician assistant.  [phone]    There is a surgeon ON CALL on weekday evenings and over the weekend in case of urgent need only, and may be contacted at the same number.    If you are having an emergency, call 911 or proceed to your nearest emergency department.    Drain Care:   Keep DAVID drain sites clean and dry. Cover with Tegaderm when showering to keep clean and dry. Record drain output daily and as needed. Keep track of output on a piece of paper and notify surgery when you have less than 30 ml out in a 24 hr period.     Strip DAVID drains 2-3 times a day. If out put has decreased significantly strip drain as it may have developed a clot.         Pending Results     No orders found from 4/19/2017 to 4/22/2017.            Statement of Approval     Ordered          04/24/17 0903  I have reviewed and agree with all the recommendations and orders detailed in this document.  EFFECTIVE NOW     Approved and electronically signed by:  Karson Fry PA-C           04/22/17 5965  I have reviewed and  agree with all the recommendations and orders detailed in this document.  EFFECTIVE NOW     Approved and electronically signed by:  Brenda Kennedy PA-C             Admission Information     Date & Time Provider Department Dept. Phone    4/21/2017 Chuy Linder MD [facility] [phone]      Your Vitals Were     Blood Pressure Temperature Respirations Height Weight Pulse Oximetry    139/72 (BP Location: Right arm) 97.3  F (36.3  C) (Oral) 18 1.524 m (5') 76.9 kg (169 lb 9.6 oz) 91%    BMI (Body Mass Index)                   33.12 kg/m2           MyChart Information     [employer] gives you secure access to your electronic health record. If you see a primary care provider, you can also send messages to your care team and make appointments. If you have questions, please call your primary care clinic.  If you do not have a primary care provider, please call [phone] and they will assist you.        Care EveryWhere ID     This is your Care EveryWhere ID. This could be used by other organizations to access your [facility] medical records  [id number]           Review of your medicines      START taking        Dose / Directions    oxyCODONE 5 MG IR tablet   Commonly known as:  ROXICODONE   Notes to Patient:  Monitor for constipation and sedation. Take only as prescribed          Dose:  5-10 mg   Take 1-2 tablets (5-10 mg) by mouth every 3 hours as needed for pain or other (Moderate to Severe)   Quantity:  30 tablet   Refills:  0         CONTINUE these medicines which have NOT CHANGED        Dose / Directions    aspirin 81 MG tablet   Notes to Patient:  Continue as you had been at home            Dose:  1 tablet   Take 1 tablet by mouth daily   Refills:  0       fluticasone 50 MCG/ACT spray   Commonly known as:  FLONASE        Dose:  2 spray   Spray 2 sprays into both nostrils daily as needed for rhinitis or allergies   Refills:  0       METOPROLOL SUCCINATE ER PO   Notes to Patient:  Continue  as you had been at home        Dose:  25 mg   Take 25 mg by mouth daily   Refills:  0            Where to get your medicines      Some of these will need a paper prescription and others can be bought over the counter. Ask your nurse if you have questions.     Bring a paper prescription for each of these medications     oxyCODONE 5 MG IR tablet                Protect others around you: Learn how to safely use, store and throw away your medicines at www.disposemymeds.org.             Medication List: This is a list of all your medications and when to take them. Check marks below indicate your daily home schedule. Keep this list as a reference.      Medications           Morning Afternoon Evening Bedtime As Needed    aspirin 81 MG tablet   Take 1 tablet by mouth daily   Notes to Patient:  Continue as you had been at home                                       fluticasone 50 MCG/ACT spray   Commonly known as:  FLONASE   Spray 2 sprays into both nostrils daily as needed for rhinitis or allergies                                   METOPROLOL SUCCINATE ER PO   Take 25 mg by mouth daily   Notes to Patient:  Continue as you had been at home                                   oxyCODONE 5 MG IR tablet   Commonly known as:  ROXICODONE   Take 1-2 tablets (5-10 mg) by mouth every 3 hours as needed for pain or other (Moderate to Severe)   Last time this was given:  5 mg on 4/23/2017  2:25 PM   Notes to Patient:  Monitor for constipation and sedation. Take only as prescribed

## 2017-04-21 NOTE — PLAN OF CARE
Problem: Perioperative Period (Adult)  Goal: Signs and Symptoms of Listed Potential Problems Will be Absent or Manageable (Perioperative Period)  Signs and symptoms of listed potential problems will be absent or manageable by discharge/transition of care (reference Perioperative Period (Adult) CPG).   Outcome: Improving  Pt arrived from pacu on cart, assisted into bed with air flavio. IV infusing into left arm, menard and 2 JPs in place and patent. Dressing on abd clean dry and intact and binder in place. Pt denies pain on arrival. O2 on at 2L NC sats 90-92 so increased it to 3L. Frequent VS started and pt encouraged to do postop exercises. Oriented to room, equipment, orders and floor routines. Family here with pt for awhile. Will stay with daughter on discharge for a little while.

## 2017-04-21 NOTE — ANESTHESIA CARE TRANSFER NOTE
Patient: Alba Phillips    Procedure(s):  Recurrant Incisional Hernia Repair with mesh and limited lysis of adhesions - Wound Class: I-Clean    Diagnosis: Incisional hernia   Diagnosis Additional Information: No value filed.    Anesthesia Type:   General, ETT, LMA     Note:  Airway :Face Mask  Patient transferred to:PACU        Vitals: (Last set prior to Anesthesia Care Transfer)    CRNA VITALS  4/21/2017 1117 - 4/21/2017 1155      4/21/2017             Pulse: 113    SpO2: 99 %    Resp Rate (observed): (!)  7                Electronically Signed By: GUILLERMO Huizar CRNA  April 21, 2017  11:55 AM

## 2017-04-21 NOTE — PHARMACY-ADMISSION MEDICATION HISTORY
Pharmacy reviewed prior to admission med list from pre-admitting rn, CHRISTOPHER Erazo.        Prior to Admission medications    Medication Sig Last Dose Taking? Auth Provider   fluticasone (FLONASE) 50 MCG/ACT spray Spray 2 sprays into both nostrils daily as needed for rhinitis or allergies 4/21/2017 at Unknown time Yes Reported, Patient   METOPROLOL SUCCINATE ER PO Take 25 mg by mouth daily 4/20/2017 at Unknown time Yes Reported, Patient   aspirin 81 MG tablet Take 1 tablet by mouth daily  Past Week at Unknown time Yes Daina Desouza MD

## 2017-04-22 LAB — GLUCOSE SERPL-MCNC: 117 MG/DL (ref 70–99)

## 2017-04-22 PROCEDURE — 82947 ASSAY GLUCOSE BLOOD QUANT: CPT | Performed by: SURGERY

## 2017-04-22 PROCEDURE — 25000132 ZZH RX MED GY IP 250 OP 250 PS 637: Mod: GY | Performed by: SURGERY

## 2017-04-22 PROCEDURE — 12000000 ZZH R&B MED SURG/OB

## 2017-04-22 PROCEDURE — 36415 COLL VENOUS BLD VENIPUNCTURE: CPT | Performed by: SURGERY

## 2017-04-22 PROCEDURE — 25000128 H RX IP 250 OP 636: Performed by: SURGERY

## 2017-04-22 PROCEDURE — A9270 NON-COVERED ITEM OR SERVICE: HCPCS | Mod: GY | Performed by: SURGERY

## 2017-04-22 RX ADMIN — CEFAZOLIN SODIUM 1 G: 1 INJECTION, SOLUTION INTRAVENOUS at 01:09

## 2017-04-22 RX ADMIN — OXYCODONE HYDROCHLORIDE 5 MG: 5 TABLET ORAL at 11:50

## 2017-04-22 RX ADMIN — SODIUM CHLORIDE: 9 INJECTION, SOLUTION INTRAVENOUS at 01:10

## 2017-04-22 RX ADMIN — OXYCODONE HYDROCHLORIDE 5 MG: 5 TABLET ORAL at 20:18

## 2017-04-22 RX ADMIN — ONDANSETRON 4 MG: 2 INJECTION INTRAMUSCULAR; INTRAVENOUS at 21:14

## 2017-04-22 RX ADMIN — OXYCODONE HYDROCHLORIDE 5 MG: 5 TABLET ORAL at 08:39

## 2017-04-22 RX ADMIN — ACETAMINOPHEN 650 MG: 325 TABLET, FILM COATED ORAL at 09:40

## 2017-04-22 RX ADMIN — ACETAMINOPHEN 650 MG: 325 TABLET, FILM COATED ORAL at 14:49

## 2017-04-22 RX ADMIN — OXYCODONE HYDROCHLORIDE 5 MG: 5 TABLET ORAL at 01:07

## 2017-04-22 RX ADMIN — OXYCODONE HYDROCHLORIDE 5 MG: 5 TABLET ORAL at 15:46

## 2017-04-22 NOTE — PLAN OF CARE
Problem: Goal Outcome Summary  Goal: Goal Outcome Summary  Outcome: No Change     RN shift [id number]:  Mild incisional discomfort, managed with rest + cold pack, declined PRN pain med at this time.  Denies nausea, tolerating sips of clears.  LS clear bilaterally, O2, encouraged hourly CDB/IS x10.  BS faint, gas-, menard.  JPs x2.  CMS+.  Drsg CDI.  Bed alarm on.

## 2017-04-22 NOTE — PLAN OF CARE
VSS except on 3L of O2. IV SL. Tolerated a full liquid diet and advanced to a regular diet. Lungs clear. Pt is SOB when ambulating, which she reports is her baseline. BS hypoactive; not passing gas. Pt nauseous; declined zofran; peppermint oil placed on 4x4 and given to pt. Voiding in the bathroom. Pain in mid abdomen. DAVID drains x2 patent and draining. Pt taking Oxycodone 5 mg every 3 hours and tylenol for pain. Pt anticipating dc home tomorrow.

## 2017-04-22 NOTE — PROGRESS NOTES
[facility]   General Surgery Progress Note           Assessment and Plan:   Assessment:   POD#1 s/p Procedure(s):  Recurrant Incisional Hernia Repair with mesh and limited lysis of adhesions - Wound Class: I-Clean      Plan:   -Pain control: PO oxycodone, tylenol. Oxycodone Rx done.   -VTE prophylaxis: PCDs  -Diet: fulls, if tolerates ok to ADAT  -Increase activity: Up walking  -Dispo: patient reports she would like to D/C today. Will want to see pain under better control, tolerating diet, voiding independently and up walking prior to D/C. Will D/C home with drains. RN to do drain teaching prior to D/C.         Interval History:   Up seated in chair. Reports increased pain today, although due to low BP this morning, unable to take oxycodone earlier. Feels she got behind on her pain medication. Tolerating clear liquids and will trial fulls this morning and ADAT. -flatus, denies nausea/vomiting. Not yet up walking. Mccracken D/C'd this morning, has not yet voided. Reports she wants to discharge home with her daughters today. Discussed goals that need to be met prior to D/C.         Physical Exam:   Blood pressure 136/73, temperature 97.8  F (36.6  C), temperature source Oral, resp. rate 16, height 1.524 m (5'), weight 76.9 kg (169 lb 9.6 oz), SpO2 97 %.    I/O last 3 completed shifts:  In: 4296 [P.O.:480; I.V.:3816]  Out: 1795 [Urine:1525; Drains:220; Blood:50]    Abdomen:   soft, obese, tenderness noted diffusely and no masses palpatedhypoactive bowel sounds   Inc(s) - clean, dry, steristrips intact. Abdominal binder in place.       Paulino x2- serosanguinous, without leaking.           Data:     Recent Labs   Lab Test  02/18/11   0958   HGB  15.2   WBC  3.6*       Brenda Kennedy PA-C     Seen and agree,    Rajat Davenport MD  [facility]

## 2017-04-22 NOTE — PLAN OF CARE
Problem: Goal Outcome Summary  Goal: Goal Outcome Summary  VSS, Afebrile. Alert and oriented. Denies n/v.Baseline numbness to middle LUE finger. Dressing to incision and cornelio x2  sites CDI. Mccracken out at 0620, patient DTV. Up to chair last night with 1A. Oxy for pain x1. Declined metoprolol early in the shift ut BPs stable. Tolerating clear liquids diet, BSs hypo- advanced to full liquid for breakfast

## 2017-04-22 NOTE — OP NOTE
DATE OF PROCEDURE:  04/21/2017      PREOPERATIVE DIAGNOSIS:  Recurrent incisional hernia.      POSTOPERATIVE DIAGNOSIS:  Recurrent incisional hernia with incarcerated and adhesed omentum.      PROCEDURE:  Incisional hernia repair with placement of retrorectus mesh (20 x 15 cm) with bilateral myofascial advancement flaps and component separation, limited lysis of adhesions.      SURGEON:  Chuy Linder MD      ASSISTANT:  Karson Fry PA-C        BLOOD LOSS:  50 mL.      INDICATION:  Alba Phillips is a 76-year-old female who has undergone previous hysterectomy for malignancy followed by hernia repair.  She has now had a recurrence of this hernia that was repaired with a primary suture technique 3 years ago.  Scanning reveals the defect to be greater than 10 cm in width.  She now presents for repair after discussion of the procedure, its risks, benefits, complications, convalescence, postop limitations, bleeding, infection, the use of mesh, the risk of mesh infection, the potential need to remove the mesh if it would become infected, as well as postoperative activity limitations, risk of skin necrosis and recurrence of the hernia.  We also discussed potential involvement of intraabdominal organs and injury to them.  She seems to understand all the issues involved quite well, all her questions have been answered and she is ready to proceed with surgery.      DESCRIPTION OF PROCEDURE:  The patient was brought to the operating room, placed supine on the table and after induction of anesthetic, the abdomen was prepped and draped in sterile manner.  A pause was performed, the site had been marked with her in preinduction.  The upper portion of the previous incision is excised.  The incision was also extended a few centimeters cephalad.  Dissection was carried out down to the hernia sac, which was then opened.  Omentum is adherent to the undersurface of the sac and this is gradually mobilized from the sac and replaced into  the abdominal cavity.  There does not appear to be any large or small bowel involved with hernia today.  The posterior sheath was then mobilized from the posterior surface of the rectus muscle bilaterally.  We then tested to see whether closure would be possible primarily and this was not the case.  We then mobilized the anterior sheath from the subcutaneous tissues and again checked for mobilization, it was still inadequate.  We therefore dissected the subcutaneous tunnel to the lateral edge of the rectus sheath and incised the external oblique aponeurosis, first on the right and then on the left after checking to see if adequate mobilization had occurred.  The external oblique aponeurosis was first incised from the rib margin caudally to slightly below the level of the anterior iliac crest.  The external oblique was then dissected from the internal oblique to improve mobility.  Once this was done on both sides, we were then finally able to bring the fascial components of the abdominal wall together in the midline without excessive tension.  A piece of Seprafilm was placed under the hernia defect after debriding thick scar tissue from the central abdomen.  The posterior sheath was closed with running 0 PDS suture.  The operative field was rinsed with IrriSept, then followed by saline.  A 20 x 15 cm Ventralight ST mesh was obtained and also soaked in IrriSept and rinsed.  This was then placed into the retrorectus space.  Dissection had been carried out several centimeters cephalad and caudad to the primary hernia defect.  We placed 3 transfascial sutures to anchor each side of the mesh.  These were passed through the lateral rectus sheath.  Three sutures were also placed at each apex, one in the midline and one slightly to the left and right of the midline.  With the mesh in place, there was gentle tension on the mesh and no tension appeared to be on the posterior sheath which had previously been closed.  We then  also closed the anterior sheath with multiple interrupted heavy PDS sutures.  Prior to finishing this closure, a #15 round Paulino drain was placed into the submuscular space and exited through the left upper abdomen.  We then also closed the subcutaneous tissues with a running 3-0 Vicryl after placing a #15 round Paulino drain into the subcutaneous space extending from the left tunnel to the right tunnel and exiting on the right side.  The skin was then closed with subcuticular sutures followed by Steri-Strips, dressings and an abdominal binder.  Prior to placing suction bulbs on the drains, Marcaine was instilled at each of them to assist with postoperative pain relief.  She was then returned to the recovery room in excellent condition with all sponge and needle counts correct, having tolerated the procedure well.     Throughout the procedure, physician's assistant was necessary for their expertise and retraction, suctioning, tissue manipulation and suturing.        NICOLAS MAX MD             D: 2017 13:49   T: 2017 01:07   MT: GINNY#[id number]      Name:     JAYESH SAENZ   MRN:      [id number]        Account:        [id number]   :      1940           Procedure Date: 2017      Document: [id number]

## 2017-04-23 LAB — GLUCOSE SERPL-MCNC: 100 MG/DL (ref 70–99)

## 2017-04-23 PROCEDURE — 25000132 ZZH RX MED GY IP 250 OP 250 PS 637: Mod: GY | Performed by: PHYSICIAN ASSISTANT

## 2017-04-23 PROCEDURE — 25000128 H RX IP 250 OP 636: Performed by: SURGERY

## 2017-04-23 PROCEDURE — A9270 NON-COVERED ITEM OR SERVICE: HCPCS | Mod: GY | Performed by: PHYSICIAN ASSISTANT

## 2017-04-23 PROCEDURE — 36415 COLL VENOUS BLD VENIPUNCTURE: CPT | Performed by: SURGERY

## 2017-04-23 PROCEDURE — A9270 NON-COVERED ITEM OR SERVICE: HCPCS | Mod: GY | Performed by: SURGERY

## 2017-04-23 PROCEDURE — 82947 ASSAY GLUCOSE BLOOD QUANT: CPT | Performed by: SURGERY

## 2017-04-23 PROCEDURE — 25000132 ZZH RX MED GY IP 250 OP 250 PS 637: Mod: GY | Performed by: SURGERY

## 2017-04-23 PROCEDURE — 12000000 ZZH R&B MED SURG/OB

## 2017-04-23 RX ORDER — AMOXICILLIN 250 MG
1 CAPSULE ORAL DAILY
Status: DISCONTINUED | OUTPATIENT
Start: 2017-04-23 | End: 2017-04-24 | Stop reason: HOSPADM

## 2017-04-23 RX ADMIN — OXYCODONE HYDROCHLORIDE 5 MG: 5 TABLET ORAL at 14:25

## 2017-04-23 RX ADMIN — OXYCODONE HYDROCHLORIDE 5 MG: 5 TABLET ORAL at 04:16

## 2017-04-23 RX ADMIN — OXYCODONE HYDROCHLORIDE 5 MG: 5 TABLET ORAL at 00:20

## 2017-04-23 RX ADMIN — ONDANSETRON 4 MG: 2 INJECTION INTRAMUSCULAR; INTRAVENOUS at 20:55

## 2017-04-23 RX ADMIN — SENNOSIDES AND DOCUSATE SODIUM 1 TABLET: 8.6; 5 TABLET ORAL at 11:37

## 2017-04-23 RX ADMIN — ONDANSETRON 4 MG: 2 INJECTION INTRAMUSCULAR; INTRAVENOUS at 13:07

## 2017-04-23 RX ADMIN — ONDANSETRON 4 MG: 2 INJECTION INTRAMUSCULAR; INTRAVENOUS at 04:11

## 2017-04-23 NOTE — PLAN OF CARE
Problem: Goal Outcome Summary  Goal: Goal Outcome Summary  Outcome: Improving  Pain managed with PRN oral pain meds + cold pack.  Mild nausea relieved after PRN zofran + rest.  JPs x2.  CMS+.  Drsg CDI.  Up with SBA, sat in chair, ambulated room multiple times.  Anticipated DC home tomorrow.

## 2017-04-23 NOTE — PLAN OF CARE
Problem: Goal Outcome Summary  Goal: Goal Outcome Summary  Outcome: No Change  VSS except on 3L of O2 after ambulating. IV SL. On a regular diet; eating small amounts of soft foods at a time. Lungs diminished; infrequent, nonproductive cough. Pt is SOB when ambulating, which she reports is her baseline. BS hypoactive; not passing gas. Stool softener ordered and given. Pt has had intermittenet nausea throughout the day; zofran given x1; peppermint oil placed on 4x4 and given to pt. Voiding in the bathroom. Pain in mid abdomen. DAVID drains x2 patent and draining. Pt taking Oxycodone 5 mg and tylenol for pain. Pt anticipating possible dc home tomorrow if she starts to imrpove.

## 2017-04-23 NOTE — PLAN OF CARE
Problem: Goal Outcome Summary  Goal: Goal Outcome Summary  Vss, alert ands oriented. Nausea with ambulation to bathroom but no emesis , zofran given with immediate relief. Voiding adequately. dressing CDI. DAVID X 2 with an ouput of 30cc for each. Tylenol for pain. On regular diet

## 2017-04-23 NOTE — PROGRESS NOTES
[facility]   General Surgery Progress Note           Assessment and Plan:   Assessment:   POD#2 s/p Procedure(s):  Recurrant Incisional Hernia Repair with mesh and limited lysis of adhesions - Wound Class: I-Clean   Postoperative ileus      Plan:   -Pain control: PO oxycodone, tylenol. Oxycodone Rx done.   -VTE prophylaxis: PCDs  -Diet: fulls, if tolerates ok to ADAT. Take it slow.   -Increase activity: Up walking  -Dispo: would like to see resolution of ileus, +flatus, prior to discharge.          Interval History:   Comfortable in bed. Reports pain is better today as she took PO medications throughout the night. Up  Walking halls this morning. Still with nausea, denies vomiting. Tolerating diet in small amounts. -flatus, -BM. Requests stool softener.          Physical Exam:   Blood pressure 126/73, temperature 97.8  F (36.6  C), temperature source Oral, resp. rate 16, height 1.524 m (5'), weight 76.9 kg (169 lb 9.6 oz), SpO2 91 %.    I/O last 3 completed shifts:  In: 1123 [P.O.:1120; I.V.:3]  Out: 1320 [Urine:1200; Drains:120]    Abdomen:   soft, obese, tenderness noted diffusely and no masses palpated, hypoactive bowel sounds   Inc(s) - clean, dry, steristrips intact. Abdominal binder in place.       Paulino x2- serosanguinous, without leaking.           Data:     Recent Labs   Lab Test  02/18/11   0958   HGB  15.2   WBC  3.6*       Brenda Kennedy PA-C     Seen and agree,  Anticipate another 1-2 days until discharge.    Rajat Davenport MD  [facility]

## 2017-04-24 VITALS
WEIGHT: 169.6 LBS | BODY MASS INDEX: 33.3 KG/M2 | OXYGEN SATURATION: 91 % | SYSTOLIC BLOOD PRESSURE: 139 MMHG | HEIGHT: 60 IN | RESPIRATION RATE: 18 BRPM | TEMPERATURE: 97.3 F | DIASTOLIC BLOOD PRESSURE: 72 MMHG

## 2017-04-24 PROCEDURE — 25000132 ZZH RX MED GY IP 250 OP 250 PS 637: Mod: GY | Performed by: SURGERY

## 2017-04-24 PROCEDURE — A9270 NON-COVERED ITEM OR SERVICE: HCPCS | Mod: GY | Performed by: SURGERY

## 2017-04-24 PROCEDURE — A9270 NON-COVERED ITEM OR SERVICE: HCPCS | Mod: GY | Performed by: PHYSICIAN ASSISTANT

## 2017-04-24 PROCEDURE — 25000132 ZZH RX MED GY IP 250 OP 250 PS 637: Mod: GY | Performed by: PHYSICIAN ASSISTANT

## 2017-04-24 RX ORDER — POLYETHYLENE GLYCOL 3350 17 G/17G
17 POWDER, FOR SOLUTION ORAL ONCE
Status: COMPLETED | OUTPATIENT
Start: 2017-04-24 | End: 2017-04-24

## 2017-04-24 RX ADMIN — SENNOSIDES AND DOCUSATE SODIUM 1 TABLET: 8.6; 5 TABLET ORAL at 08:19

## 2017-04-24 RX ADMIN — ACETAMINOPHEN 650 MG: 325 TABLET, FILM COATED ORAL at 05:18

## 2017-04-24 RX ADMIN — POLYETHYLENE GLYCOL 3350 17 G: 17 POWDER, FOR SOLUTION ORAL at 09:41

## 2017-04-24 RX ADMIN — ACETAMINOPHEN 650 MG: 325 TABLET, FILM COATED ORAL at 12:20

## 2017-04-24 NOTE — PLAN OF CARE
Problem: Goal Outcome Summary  Goal: Goal Outcome Summary  Outcome: Adequate for Discharge Date Met:  04/24/17  A/O. Passing flatus. Tolerated lunch. No nausea. Up in rodriguez sba. JPs redressed and pt educated on dressing change and striping drains as well as edu on how to record output and when to call output into surgeon.  AVS reviewed at length. Med education. Discharged with filled script for oxy. Pain controled with tylenol. No further questions at discharge. Discharged home with son in law.

## 2017-04-24 NOTE — PROGRESS NOTES
[facility]   General Surgery Progress Note         Assessment and Plan:   Assessment:   POD#3 s/p Procedure(s):  Recurrent Incisional Hernia Repair with mesh and limited lysis of adhesions - Wound Class: I-Clean   Postoperative ileus - resolving      Plan:   -Pain control: PO oxycodone, tylenol.   -VTE prophylaxis: PCDs  -Diet: regular diet  -Bowel program: senna, Miralax once  -Increase activity: Up walking  -Dispo: plan to DC home later today as long as she is tolerating diet and continues to have bowel activity.         Interval History:   Sitting up in chair. Comfortable with Tylenol. Feels much better since she started passing flatus last night and continues this morning. No BM yet. She had some nausea yesterday with food. She is hungry this morning and is awaiting breakfast. She wants to go home.         Physical Exam:   Blood pressure 139/72, temperature 97.3  F (36.3  C), temperature source Oral, resp. rate 18, height 1.524 m (5'), weight 76.9 kg (169 lb 9.6 oz), SpO2 91 %.    I/O last 3 completed shifts:  In: 1200 [P.O.:1200]  Out: 1520 [Urine:1225; Drains:295]    Abdomen:   soft, obese, mild tenderness, +bowel sounds   Inc(s) - clean, dry, steristrips intact. Abdominal binder in place.     Paulino x2- serosanguinous, without leaking.           Data:     Recent Labs   Lab Test  02/18/11   0958   HGB  15.2   WBC  3.6*       Max Bill Fry PA-C     As above, reviewed surgery  She will return to our office before leaving for [place]  She will return if DAVID output is <30cc/d  Chuy Linder MD  4/24/2017 12:41 PM

## 2017-04-24 NOTE — PLAN OF CARE
Problem: Goal Outcome Summary  Goal: Goal Outcome Summary  Outcome: Improving  VS stable, afebrile. Denies nausea at this time. Bs active, passing flatus. Having some abdominal discomfort, abd distended, firm. Tylenol given for comfort. Continue to monitor.

## 2017-04-24 NOTE — PLAN OF CARE
Problem: Goal Outcome Summary  Goal: Goal Outcome Summary  Outcome: No Change  TERESO RN:  Pt reported she did pass flatus late this evening.  Nausea intermittently this shift, but improved after Zofran given.  Bowel sounds remain hypoactive on the right side, but more active on the left.  2 DAVID drains in place.  Dressing D/I to abdomen.  Binder in place.  Ambulating in room independently and in hallway with SBA.  Does become short of breath with activity.  Placed on 2L after ambulating as sats dropped to 88% on RA.  Encouraged use of IS.  Encouraged fluids as tolerated.  Refused pain medication.  Will continue to monitor.

## 2017-04-24 NOTE — PROVIDER NOTIFICATION
Pt c/o just feeling uncomfortable and somewhat nauseous.  Poor appetite this evening.  Bowel sounds hypoactive on the right and more active on the left.  Still unable to pass flatus.  Up ambulating in the hallway.  Spoke with Dr. Davenport regarding pt condition and requested PRN Reglan or bowel medications.  Did not wish to order any further medications at this time.  Believes pt is progressing as per usual.  Continue to encourage fluids and ambulation.  Will continue to monitor.

## 2017-04-24 NOTE — DISCHARGE INSTRUCTIONS
HOME CARE FOLLOWING ABDOMINAL SURGERY  LITA Gil E. Gavin, N. Guttormson, D. Maurer, ZULMA Mckeon     Special instructions for Alba Phillips:  --call and return to clinic for drain removal when output is less than 30cc/24 hours per drain.   [phone]         INCISIONAL CARE:  Replace the bandage over your incision (or incisions) until all drainage stops, or if more comfortable to have in place.  If present, leave the steri-strips (white paper tapes) in place for 14 days after surgery.  If you have staples in your incision at the time of discharge, they will be removed at your follow-up appointment.  If Dermabond (a type of skin glue) is present, leave in place until it wears/flakes off.     BATHING:  Avoid baths for 1 week after surgery.  Showers are okay.  You may wash your hair at any time.  Gently pat your incision dry after bathing.    ACTIVITY:  Light Activity -- you may immediately be up and about as tolerated.  Driving -- you may drive when comfortable and off narcotic pain medications.  Light Work -- resume when comfortable off pain medications.  (If you can drive, you probably can work.)  Strenuous Work/Activity -- limit lifting to 20 pounds for 6 weeks.  Then, progressively increase with time.  Active Sports (running, biking, etc.) -- cautiously resume after 6 weeks.    DISCOMFORT:  Use pain medications as prescribed by your surgeon.  Take the pain medication with some food, when possible, to minimize side effects.  Expect gradual improvement.    DIET:  Return to diet you were on before surgery, unless you are given specific diet instructions.  Drink plenty of fluids.  While taking pain medications, increase dietary fiber or add a fiber supplementation like Metamucil or Citrucel to help prevent constipation - a possible side effect of pain medications.    NAUSEA:  If nauseated from the anesthetic/pain meds; rest in bed, get up cautiously with assistance, and drink clear  liquids (juice, tea, broth).    RETURN APPOINTMENT:  Schedule a follow-up visit 2-3 weeks after discharge from the hospital.  Office Phone:  [phone]     CONTACT US IF THE FOLLOWING DEVELOPS:   1. A fever that is above 101     2. If there is a large amount of drainage, bleeding, or swelling.   3. Severe pain that is not relieved by your prescription.   4. Drainage that is thick, cloudy, yellow, green or white.   5. Any other questions not answered by  Frequently Asked Questions  sheet.      FREQUENTLY ASKED QUESTIONS:    Q:  How should my incision look?    A:  Normally your incision will appear slightly swollen with light redness directly along the incision itself as it heals.  It may feel like a bump or ridge as the healing/scarring happens, and over time (3-4 months) this bump or ridge feeling should slowly go away.  In general, clear or pink watery drainage can be normal at first as your incision heals, but should decrease over time.    Q:  How do I know if my incision is infected?  A:  Look at your incision for signs of infection, like redness around the incision spreading to surrounding skin, or drainage of cloudy or foul-smelling drainage.  If you feel warm, check your temperature to see if you are running a fever.    **If any of these things occur, please notify the nurse at our office.  We may need you to come into the office for an incision check.      Q:  How do I take care of my incision?  A:  If you have a dressing in place - Starting the day after surgery, replace the dressing 1-2 times a day until there is no further drainage from the incision.  At that time, a dressing is no longer needed.  Try to minimize tape on the skin if irritation is occurring at the tape sites.  If you have significant irritation from tape on the skin, please call the office to discuss other method of dressing your incision.    Small pieces of tape called  steri-strips  may be present directly overlying your incision; these  may be removed 10 days after surgery unless otherwise specified by your surgeon.  If these tapes start to loosen at the ends, you may trim them back until they fall off or are removed.    A:  If you had  Dermabond  tissue glue used as a dressing (this causes your incision to look shiny with a clear covering over it) - This type of dressing wears off with time and does not require more dressings over the top unless it is draining around the glue as it wears off.  Do not apply ointments or lotions over the incisions until the glue has completely worn off.    Q:  There is a piece of tape or a sticky  lead  still on my skin.  Can I remove this?  A:  Sometimes the sticky  leads  used for monitoring during surgery or for evaluation in the emergency department are not all removed while you are in the hospital.  These sometimes have a tab or metal dot on them.  You can easily remove these on your own, like taking off a band-aid.  If there is a gel substance under the  lead , simply wipe/clean it off with a washcloth or paper towel.      Q:  What can I do to minimize constipation (very hard stools, or lack of stools)?  A:  Stay well hydrated.  Increase your dietary fiber intake or take a fiber supplement -with plenty of water.  Walk around frequently.  You may consider an over-the-counter stool-softener.  Your Pharmacist can assist you with choosing one that is stocked at your pharmacy.  Constipation is also one of the most common side effects of pain medication.  If you are using pain medication, be pro-active and try to PREVENT problems with constipation by taking the steps above BEFORE constipation becomes a problem.    Q:  What do I do if I need more pain medications?  A:  Call the office to receive refills.  Be aware that certain pain meds cannot be called into a pharmacy and actually require a paper prescription.  A change may be made in your pain med as you progress thru your recovery period or if you have side  effects to certain meds.    --Pain meds are NOT refilled after 5pm on weekdays, and NOT AT ALL on the weekends, so please look ahead to prevent problems.      Q:  Why am I having a hard time sleeping now that I am at home?  A:  Many medications you receive while you are in the hospital can impact your sleep for a number of days after your surgery/hospitalization.  Decreased level of activity and naps during the day may also make sleeping at night difficult.  Try to minimize day-time naps, and get up frequently during the day to walk around your home during your recovery time.  Sleep aides may be of some help, but are not recommended for long-term use.      Q:  I am having some back discomfort.  What should I do?  A:  This may be related to certain positioning that was required for your surgery, extended periods of time in bed, or other changes in your overall activity level.  You may try ice, heat, acetaminophen, or ibuprofen to treat this temporarily.  Note that many pain medications have acetaminophen in them and would state this on the prescription bottle.  Be sure not to exceed the maximum of 4000mg per day of acetaminophen.     **If the pain you are having does not resolve, is severe, or is a flare of back pain you have had on other occasions prior to surgery, please contact your primary physician for further recommendations or for an appointment to be examined at their office.    Q:  Why am I having headaches?  A:  Headaches can be caused by many things:  caffeine withdrawal, use of pain meds, dehydration, high blood pressure, lack of sleep, over-activity/exhaustion, flare-up of usual migraine headaches.  If you feel this is related to muscle tension (a band-like feeling around the head, or a pressure at the low-back of the head) you may try ice or heat to this area.  You may need to drink more fluids (try electrolyte drink like Gatorade), rest, or take your usual migraine medications.   **If your headaches do  not resolve, worsen, are accompanied by other symptoms, or if your blood pressure is high, please call your primary physician for recommendation and/or examination.    Q:  I am unable to urinate.  What do I do?  A:  A small percentage of people can have difficulty urinating initially after surgery.  This includes being able to urinate only a very small amount at a time and feeling discomfort or pressure in the very low abdomen.  This is called  urinary retention , and is actually an urgent situation.  Proceed to your nearest Emergency department for evaluation (not an Urgent Care Center).  Sometimes the bladder does not work correctly after certain medications you receive during surgery, or related to certain procedures.  You may need to have a catheter placed until your bladder recovers.  When planning to go to an Emergency department, it may help to call the ER to let them know you are coming in for this problem after a surgery.  This may help you get in quicker to be evaluated.  **If you have symptoms of a urinary tract infection, please contact your primary physician for the proper evaluation and treatment.          If you have other questions, please call the office Monday thru Friday between 8am and 5pm to discuss with the nurse or physician assistant.  #[phone]    There is a surgeon ON CALL on weekday evenings and over the weekend in case of urgent need only, and may be contacted at the same number.    If you are having an emergency, call 911 or proceed to your nearest emergency department.    Drain Care:   Keep DAVID drain sites clean and dry. Cover with Tegaderm when showering to keep clean and dry. Record drain output daily and as needed. Keep track of output on a piece of paper and notify surgery when you have less than 30 ml out in a 24 hr period.     Strip DAVID drains 2-3 times a day. If out put has decreased significantly strip drain as it may have developed a clot.

## 2017-04-26 NOTE — DISCHARGE SUMMARY
[facility]    Discharge Summary  Surgery    Date of Admission:  4/21/2017  Date of Discharge:  4/24/2017  3:18 PM  Discharging Provider: Karson Fry PA-C  Discharge Summary Note completed by: Karson Fry PA-C on 4/26/2017  Date of Service: The patient was personally seen by Discharging Providers on the day of discharge.    Discharge Diagnoses   S/p recurrent incisional hernia repair with mesh and limited lysis of adhesion.    Procedure/Surgery Information   Procedure(s):  Recurrent Incisional Hernia Repair with mesh and limited lysis of adhesions - Wound Class: I-Clean   Surgeon(s) and Role:     * Chuy Linder MD - Primary     * Ang, Karson Ordaz PA-C - Assisting     Specimens: * No specimens in log *   Non-operative procedures: None performed       History of Present Illness   Alba Phillips is a 76-year-old female who has undergone previous hysterectomy for malignancy followed by hernia repair. She has now had a recurrence of this hernia that was repaired with a primary suture technique 3 years ago. Scanning reveals the defect to be greater than 10 cm in width. She now presents for repair after discussion of the procedure, its risks, benefits, complications, convalescence, postop limitations, bleeding, infection, the use of mesh, the risk of mesh infection, the potential need to remove the mesh if it would become infected, as well as postoperative activity limitations, risk of skin necrosis and recurrence of the hernia. We also discussed potential involvement of intraabdominal organs and injury to them. She seems to understand all the issues involved quite well, all her questions have been answered and she is ready to proceed with surgery.     Hospital Course   lAba Phillips was admitted on 4/21/2017.  The following problems were addressed during her hospitalization:  Patient Active Problem List   Diagnosis     Incisional hernia, incarcerated       Post-operative antibiotic  therapy included: Ancef.  Post-operative pain control: was via IV until able to tolerate PO intake and transitioned to PO pain meds.    Remarkable hospital course events: None. She had a post-operative ileus as expected. She also had post-operative pain as expected. This was addressed during her stay. Alba met all criteria for release on 4/24/2017  3:18 PM. She was afebrile, tolerating diet, pain controlled on PO meds, ambulating well, and had return of bowel function.    Medications discontinued or adjusted during this hospitalization: see discharge med list below.    Antibiotics prescribed at discharge: None prescribed     Imaging study follow up needs:   -None performed    Discharge Instructions and Follow-Up:  Discharge diet: Regular   Discharge activity: Lifting restricted to 20 pounds   Discharge follow-up: Follow up with me in 1-3 weeks   Wound/Incision care: May get incision wet in shower but do not soak or scrub       Karson Fry PA-C      Discharge Disposition   Discharged to home   Condition at discharge: Stable    Pending Results   Final pathology results: No pathology submitted  Unresulted Labs Ordered in the Past 30 Days of this Admission     No orders found from 2/20/2017 to 4/22/2017.          Primary Care Physician   [facility]    Consultations This Hospital Stay   None    Discharge Orders   No discharge procedures on file.  Discharge Medications   Discharge Medication List as of 4/24/2017  9:51 AM      START taking these medications    Details   oxyCODONE (ROXICODONE) 5 MG IR tablet Take 1-2 tablets (5-10 mg) by mouth every 3 hours as needed for pain or other (Moderate to Severe), Disp-30 tablet, R-0, Local Print         CONTINUE these medications which have NOT CHANGED    Details   fluticasone (FLONASE) 50 MCG/ACT spray Spray 2 sprays into both nostrils daily as needed for rhinitis or allergies, Historical      METOPROLOL SUCCINATE ER PO Take 25 mg by mouth daily, Historical       aspirin 81 MG tablet Take 1 tablet by mouth daily , Historical           Allergies   Allergies   Allergen Reactions     Darvocet [Propoxyphene N-Apap] Nausea     Dilaudid [Hydromorphone] Nausea     Data   Most Recent 3 CBC's:  Recent Labs   Lab Test  02/18/11   0958   WBC  3.6*   HGB  15.2   MCV  88   PLT  160      Most Recent 3 BMP's:  Recent Labs   Lab Test  04/23/17   0555  04/22/17   0555  02/18/11   0958   NA   --    --   142   POTASSIUM   --    --   4.3   CHLORIDE   --    --   104   CO2   --    --   24   BUN   --    --   14   CR   --    --   0.67   ANIONGAP   --    --   14   JESE   --    --   9.4   GLC  100*  117*  102*     Most Recent 2 LFT's:  Recent Labs   Lab Test  02/18/11   0958   AST  29   ALT  25   ALKPHOS  94   BILITOTAL  0.8     Most Recent INR's and Anticoagulation Dosing History:  Anticoagulation Dose History     Recent Dosing and Labs Latest Ref Rng & Units 1/27/2008    INR 0.86 - 1.14 0.91        Most Recent 3 Troponin's:No lab results found.  Most Recent Cholesterol Panel:No lab results found.  Most Recent 6 Bacteria Isolates From Any Culture (See EPIC Reports for Culture Details):  Recent Labs   Lab Test  03/04/17   1201   CULT  No Beta Streptococcus isolated     Most Recent TSH, T4 and A1c Labs:No lab results found.  Results for orders placed or performed in visit on 05/11/11   CT Chest w contrast*    Impression       CT CHEST WITH CONTRAST  May 16, 2011 2:09 PM      COMPARISON: Chest x-ray of 3/17/2011.     HISTORY: Abnormal chest x-ray, fullness in the right cardiophrenic  angle.     TECHNIQUE: Volumetric helical acquisition of CT images of the chest  from the clavicles to the kidneys were acquired after the  administration of 80 mL Optiray 350 IV contrast.     FINDINGS: There is fat in the right cardiophrenic angle, which likely  represents a mildly prominent pericardial fat pad versus a small fat  containing Morgagni hernia. No adenopathy or pulmonary parenchymal  mass lesion in this  region is evident. No pleural or pericardial  effusion. Tiny amount of air space disease in the inferolateral left  lower lobe, possibly related to recent pneumonia. Minimal peripheral  linear atelectasis and/or fibrosis. Visualized thyroid is  unremarkable. Upper abdomen demonstrates no acute findings.     IMPRESSION: The finding on chest x-ray in the right cardiophrenic  angle likely represents prominent fat, either from a prominent  pericardial fat pad or a small fat containing Morgagni hernia.

## 2017-04-27 ENCOUNTER — TELEPHONE (OUTPATIENT)
Dept: SURGERY | Facility: CLINIC | Age: 77
End: 2017-04-27

## 2017-04-27 NOTE — TELEPHONE ENCOUNTER
GENERAL SURGERY NURSE PHONE TRIAGE     Alba Phillips      MRN# [id number]  AGE:  76 year old     YOB: 1940  [phone] (home)      Surgeon: Dr. Linder      Surgery type: Recurrent Incisional Hernia Repair with mesh and limited lysis of adhesions       Surgery Date: April / 21 / 2017     POD: 6     CHIEF CONCERN:  constipation     HISTORY OF PRESENT ILLNESS:      CONSTIPATION  Symptoms:  abdominal distension  Last bowel movement: 6 days ago.  Therapies tried: change in diet, increase in fiber, increase in fluids, prune juice and stool softeners    Description:   Frequency of bowel movements: pre op bowel status: every day, rarely 3 days.  Accompanying Signs & Symptoms:  Abdominal pain (cramping?): slight intermittent increase in discomfort.  Afebrile  + gas  Blood in stool: no   Rectal pain: no   Nausea/vomiting: no   Weight loss or gain: no   History:   History of abdominal surgery: YES  Precipitating factors:   Recent use of narcotics,   Chronic Laxative Use: no       PLAN:   Patent will continue above interventions.  Will do fleets enema or dulcolax suppository.     Alba Phillips  is recommended to contact the clinic if worsening pain, onset of fever/redness at any incision site, or new drainage from the area. Pt also recommended to call office at any time if ongoing questions/concerns during recovery. Pt is in agreement with this plan.  Abena Carrera RN

## 2017-05-01 ENCOUNTER — TELEPHONE (OUTPATIENT)
Dept: SURGERY | Facility: CLINIC | Age: 77
End: 2017-05-01

## 2017-05-01 NOTE — TELEPHONE ENCOUNTER
GENERAL SURGERY NURSE PHONE TRIAGE     Alba Phillips      MRN# [id number]  AGE:  76 year old     YOB: 1940  [phone] (home) none (work) Mobile   Telephone Information:   Mobile [phone]         Surgeon: Dr. Linder      Surgery type: Recurrent incisional hernia with incarcerated and adhesed omentum       Surgery Date: April / 21 / 2017     POD: 10     CHIEF CONCERN:  Drains     HISTORY OF PRESENT ILLNESS:    Review of patients chart- AVS instructions, Okay to D/C drain when less than 30 cc for 24 hours    Drain #1 30 ml last 24 hours  Drain #2 25 mil last 24 hours.    Appointment offered to patient for tomorrow 5/2/17- she prefers to wait until 5/3/17, hoping both may be discontinued.  PLAN:     Plan:  clinic appointment with provider weds 5/3/17    Alba Phillips  is recommended to contact the clinic if worsening pain, onset of fever/redness at any incision site, or new drainage from the area. Pt also recommended to call office at any time if ongoing questions/concerns during recovery. Pt is in agreement with this plan.  Abena Carrera RN

## 2017-05-01 NOTE — TELEPHONE ENCOUNTER
Name of patient: Alba Phillips   MRN#:  [id number]     Name of caller: Alba  Reason for call:  Call to set appt ro remove drains.  Surgeon: Dr. Linder  Recent surgery:  Yes   If yes, when April / 21 / 2017             and what type: Recurrent incisional hernia with incarcerated and adhesed omentum.       Best phone number to reach pt at is:  [phone]    Okay to leave a message with medical info? Yes  Pharmacy preferred (if calling for a refill):   NA

## 2017-05-03 ENCOUNTER — OFFICE VISIT (OUTPATIENT)
Dept: SURGERY | Facility: CLINIC | Age: 77
End: 2017-05-03
Payer: COMMERCIAL

## 2017-05-03 VITALS
SYSTOLIC BLOOD PRESSURE: 138 MMHG | WEIGHT: 169 LBS | HEART RATE: 121 BPM | BODY MASS INDEX: 33.18 KG/M2 | OXYGEN SATURATION: 92 % | HEIGHT: 60 IN | DIASTOLIC BLOOD PRESSURE: 78 MMHG | TEMPERATURE: 97.2 F

## 2017-05-03 DIAGNOSIS — Z09 SURGICAL FOLLOWUP VISIT: Primary | ICD-10-CM

## 2017-05-03 PROCEDURE — 99024 POSTOP FOLLOW-UP VISIT: CPT | Performed by: PHYSICIAN ASSISTANT

## 2017-05-03 NOTE — MR AVS SNAPSHOT
After Visit Summary   5/3/2017    Alba Phillips    MRN: [id number]           Patient Information     Date Of Birth          1940        Visit Information        Provider Department      5/3/2017 2:00 PM Blank Canales PA-C [facility] General Surgery      Today's Diagnoses     Surgical followup visit    -  1       Follow-ups after your visit        Follow-up notes from your care team     Return in about 1 week (around 5/10/2017).      Your next 10 appointments already scheduled     May 11, 2017  1:15 PM CDT   Post Op with Chuy Linder MD   [facility] ([facility])    [address] [facility] [address]  [facility] [address] [phone]              Who to contact     If you have questions or need follow up information about today's clinic visit or your schedule please contact [facility] directly at [phone].  Normal or non-critical lab and imaging results will be communicated to you by [employer], letter or phone within 4 business days after the clinic has received the results. If you do not hear from us within 7 days, please contact the clinic through [employer] or phone. If you have a critical or abnormal lab result, we will notify you by phone as soon as possible.  Submit refill requests through [employer] or call your pharmacy and they will forward the refill request to us. Please allow 3 business days for your refill to be completed.          Additional Information About Your Visit        MyChart Information     [employer] gives you secure access to your electronic health record. If you see a primary care provider, you can also send messages to your care team and make appointments. If you have questions, please call your primary care clinic.  If you do not have a primary care provider, please call [phone] and they will assist you.        Care EveryWhere ID     This is  your Care EveryWhere ID. This could be used by other organizations to access your [place] medical records  [id number]        Your Vitals Were     Pulse Temperature Height Pulse Oximetry BMI (Body Mass Index)       121 97.2  F (36.2  C) (Oral) 5' (1.524 m) 92% 33.01 kg/m2        Blood Pressure from Last 3 Encounters:   05/03/17 138/78   04/24/17 139/72   03/30/17 154/90    Weight from Last 3 Encounters:   05/03/17 169 lb (76.7 kg)   04/21/17 169 lb 9.6 oz (76.9 kg)   03/30/17 170 lb (77.1 kg)              Today, you had the following     No orders found for display       Primary Care Provider    [address] [address]        Thank you!     Thank you for choosing [facility]  for your care. Our goal is always to provide you with excellent care. Hearing back from our patients is one way we can continue to improve our services. Please take a few minutes to complete the written survey that you may receive in the mail after your visit with us. Thank you!             Your Updated Medication List - Protect others around you: Learn how to safely use, store and throw away your medicines at www.disposemymeds.org.          This list is accurate as of: 5/3/17  2:44 PM.  Always use your most recent med list.                   Brand Name Dispense Instructions for use    aspirin 81 MG tablet      Take 1 tablet by mouth daily       fluticasone 50 MCG/ACT spray    FLONASE     Spray 2 sprays into both nostrils daily as needed for rhinitis or allergies       METOPROLOL SUCCINATE ER PO      Take 25 mg by mouth daily

## 2017-05-03 NOTE — PROGRESS NOTES
[facility] Note 5/3/2017    Subjective:  Alba Phillips is here for her first postoperative visit.  She underwent Incisional hernia repair with Ventralight mesh ([id number]) by Dr. Linder on April/21.  Op note and surgical procedure discussed with the patient.  She is now 12 days postop.  There were 2 Paulino drains placed at the time of repair.  Output has decreased appropriately and both are ready for removal.    Her recent pain medications include: none.  She has been compliant with activity restrictions since surgery.  Her only concern today is a single sore spot at the right infrascapular area; very tender with any pressure at the site.      Objective:  Abd - soft, non-tender, non-distended.  Binder in place.  Back - +muscular tension and tenderness noted at the right paravertebral muscles just at the lateral aspect of the erector spinae below the scapula.  No erythema/rash/lesions of the overlying skin.  No bruising.  Inc at midline- steri-strips in place - starting to loosen, healing well, no erythema/bruising, no seroma/hematoma noted, no hernia noted  Drains - serous fluid in bulbs.  Mild erythema/irritation noted around drain exit point, no leakage around drains.  Entire Paulino drains removed without difficulty.  Bacitracin/gauze dressing applied.    Assessment:  -Drain removal x2; s/p Incisional hernia repair with Ventralight mesh ([id number])  -Muscular tension/tenderness, right midback; related to extensive anterior abdominal wall reconstruction    Plan:  I reviewed care of the previous drain sites with the patient; gauze/tape provided.  I also reviewed recommendations for continued use of pain meds as needed.  She will also try ice/heat on the right midback muscles for symptomatic relief.  If this worsens or continues, muscle relaxant may be considered; she would like to hold off on this for now.  We also discussed starting to wean out of using the abdominal binder at this time.    Alba rooney  RTC to see Dr. Linder in 1 week for her regular post-operative appointment.  She will get further instruction regarding activity and weight restrictions at that time.  Until then, she was recommended to remain at a 15 lb limit.    Blank Canales PA-C    Please route or send letter to:  *None*

## 2017-05-11 ENCOUNTER — OFFICE VISIT (OUTPATIENT)
Dept: SURGERY | Facility: CLINIC | Age: 77
End: 2017-05-11
Payer: COMMERCIAL

## 2017-05-11 VITALS
DIASTOLIC BLOOD PRESSURE: 64 MMHG | HEIGHT: 60 IN | HEART RATE: 100 BPM | SYSTOLIC BLOOD PRESSURE: 128 MMHG | OXYGEN SATURATION: 95 % | BODY MASS INDEX: 33.18 KG/M2 | WEIGHT: 169 LBS

## 2017-05-11 DIAGNOSIS — Z09 SURGICAL FOLLOWUP VISIT: Primary | ICD-10-CM

## 2017-05-11 PROCEDURE — 99024 POSTOP FOLLOW-UP VISIT: CPT | Performed by: SURGERY

## 2017-05-11 NOTE — PROGRESS NOTES
She returns in follow-up after large hernia repair.  She had a greater than 10 cm fascial defect which was closed with component separation technique and a retrorectus mesh underlay.  She is quite pleased with her ability to move or walk without the hernia protruding.  She does have some tenderness at one of the transfascial suture sites on the right side, however, this is mild and I would expect it to resolve completely as the suture dissolves.  Additionally, she has some pain in the right posterior flank pain. Examination of this area reveals no abnormality.  This is near the tip of her 11th rib.  I cannot really relate this to her surgery except possibly on the basis of temporary muscle imbalance.  I would expect this to resolve on its own.  If it does not, I think the next appropriate step would be to obtain a CT scan of the area.  The remainder of her exam is unremarkable with no apparent incisional healing problems or signs of infection  She'll be returning home tomorrow and will follow-up with her primary physician there if her symptoms continue.  She will obtain her CT scan there necessary.  I'll be happy to discuss her care with her primary physician at any time.    Chuy Linder MD  5/11/2017 1:38 PM    Please route or send letter to:  Primary Care Provider (PCP) ([place]), Include Op Note and Include Progress Note

## 2017-05-11 NOTE — MR AVS SNAPSHOT
After Visit Summary   5/11/2017    Alba Phillips    MRN: [id number]           Patient Information     Date Of Birth          1940        Visit Information        Provider Department      5/11/2017 1:15 PM Chuy Linder MD [facility] Hernia      Today's Diagnoses     Surgical followup visit    -  1       Follow-ups after your visit        Who to contact     If you have questions or need follow up information about today's clinic visit or your schedule please contact [facility] directly at [phone].  Normal or non-critical lab and imaging results will be communicated to you by [employer], letter or phone within 4 business days after the clinic has received the results. If you do not hear from us within 7 days, please contact the clinic through [employer] or phone. If you have a critical or abnormal lab result, we will notify you by phone as soon as possible.  Submit refill requests through [email or web address] or call your pharmacy and they will forward the refill request to us. Please allow 3 business days for your refill to be completed.          Additional Information About Your Visit        MyChart Information     [email or web address] gives you secure access to your electronic health record. If you see a primary care provider, you can also send messages to your care team and make appointments. If you have questions, please call your primary care clinic.  If you do not have a primary care provider, please call [phone] and they will assist you.        Care EveryWhere ID     This is your Care EveryWhere ID. This could be used by other organizations to access your [place] medical records  [id number]        Your Vitals Were     Pulse Height Pulse Oximetry BMI (Body Mass Index)          100 5' (1.524 m) 95% 33.01 kg/m2         Blood Pressure from Last 3 Encounters:   05/11/17 128/64   05/03/17 138/78   04/24/17 139/72    Weight from Last  3 Encounters:   05/11/17 169 lb (76.7 kg)   05/03/17 169 lb (76.7 kg)   04/21/17 169 lb 9.6 oz (76.9 kg)              Today, you had the following     No orders found for display       Primary Care Provider    [facility]       [address] [place] [id number]        Thank you!     Thank you for choosing [facility]  for your care. Our goal is always to provide you with excellent care. Hearing back from our patients is one way we can continue to improve our services. Please take a few minutes to complete the written survey that you may receive in the mail after your visit with us. Thank you!             Your Updated Medication List - Protect others around you: Learn how to safely use, store and throw away your medicines at www.disposemymeds.org.          This list is accurate as of: 5/11/17  1:41 PM.  Always use your most recent med list.                   Brand Name Dispense Instructions for use    aspirin 81 MG tablet      Take 1 tablet by mouth daily       fluticasone 50 MCG/ACT spray    FLONASE     Spray 2 sprays into both nostrils daily as needed for rhinitis or allergies       METOPROLOL SUCCINATE ER PO      Take 25 mg by mouth daily

## 2017-05-11 NOTE — LETTER
May 11, 2017    RE:  Alba Phlilips-:  [birth year]    She returns in follow-up after large hernia repair. She had a greater than 10 cm fascial defect which was closed with component separation technique and a retrorectus mesh underlay. She is quite pleased with her ability to move or walk without the hernia protruding. She does have some tenderness at one of the transfascial suture sites on the right side, however, this is mild and I would expect it to resolve completely as the suture dissolves. Additionally, she has some pain in the right posterior flank pain. Examination of this area reveals no abnormality. This is near the tip of her 11th rib. I cannot really relate this to her surgery except possibly on the basis of temporary muscle imbalance. I would expect this to resolve on its own. If it does not, I think the next appropriate step would be to obtain a CT scan of the area. The remainder of her exam is unremarkable with no apparent incisional healing problems or signs of infection.    She'll be returning home tomorrow and will follow-up with her primary physician there if her symptoms continue. She will obtain her CT scan there necessary. I'll be happy to discuss her care with her primary physician at any time.     Chuy Linder MD

## 2019-11-08 ENCOUNTER — HEALTH MAINTENANCE LETTER (OUTPATIENT)
Age: 79
End: 2019-11-08

## 2020-02-23 ENCOUNTER — HEALTH MAINTENANCE LETTER (OUTPATIENT)
Age: 80
End: 2020-02-23

## 2020-12-06 ENCOUNTER — HEALTH MAINTENANCE LETTER (OUTPATIENT)
Age: 80
End: 2020-12-06

## 2020-12-11 NOTE — ANESTHESIA POSTPROCEDURE EVALUATION
Patient: Alba Phillips    Procedure(s):  Recurrant Incisional Hernia Repair with mesh and limited lysis of adhesions - Wound Class: I-Clean    Diagnosis:Incisional hernia   Diagnosis Additional Information: Recurrent incisional hernias  Dr. Linder    Anesthesia Type:  General, ETT    Note:  Anesthesia Post Evaluation    Patient location during evaluation: PACU  Patient participation: Able to fully participate in evaluation  Level of consciousness: awake  Pain management: adequate  Airway patency: patent  Cardiovascular status: acceptable  Respiratory status: acceptable  Hydration status: acceptable  PONV: none             Last vitals:  Vitals:    04/21/17 1200 04/21/17 1205 04/21/17 1210   BP: 125/75 140/82 139/82   Resp: 10 12 13   Temp:      SpO2: 99% 99% 95%         Electronically Signed By: Robe Zaldivar MD  April 21, 2017  12:16 PM   83

## 2021-04-11 ENCOUNTER — HEALTH MAINTENANCE LETTER (OUTPATIENT)
Age: 81
End: 2021-04-11

## 2021-09-25 ENCOUNTER — HEALTH MAINTENANCE LETTER (OUTPATIENT)
Age: 81
End: 2021-09-25

## 2022-05-07 ENCOUNTER — HEALTH MAINTENANCE LETTER (OUTPATIENT)
Age: 82
End: 2022-05-07

## 2023-04-22 ENCOUNTER — HEALTH MAINTENANCE LETTER (OUTPATIENT)
Age: 83
End: 2023-04-22

## 2023-06-02 ENCOUNTER — HEALTH MAINTENANCE LETTER (OUTPATIENT)
Age: 83
End: 2023-06-02

## (undated) DEVICE — ESU ELEC BLADE 2.75" COATED/INSULATED E1455

## (undated) DEVICE — CATH TRAY FOLEY SURESTEP 16FR DRAIN BAG STATOCK A899916

## (undated) DEVICE — TUBING SUCTION 12"X1/4" N612

## (undated) DEVICE — SPONGE LAP 18X18" X8435

## (undated) DEVICE — DRAIN JACKSON PRATT 15FR ROUND SIL LF JP-2229

## (undated) DEVICE — SU VICRYL 3-0 SH 27" UND J416H

## (undated) DEVICE — SOL WATER IRRIG 1000ML BOTTLE 2F7114

## (undated) DEVICE — GLOVE PROTEXIS W/NEU-THERA 7.5  2D73TE75

## (undated) DEVICE — SOL NACL 0.9% 30ML VIAL

## (undated) DEVICE — PACK MINOR CUSTOM RIDGES SBA32RMRMA

## (undated) DEVICE — CLEANSER WOUND IRRISEPT 0.05% CHG IRRISEPT-403

## (undated) DEVICE — SUCTION TIP YANKAUER W/O VENT K86

## (undated) DEVICE — PREP SKIN SCRUB TRAY 4461A

## (undated) DEVICE — DRAPE LAP W/ARMBOARD 29410

## (undated) DEVICE — LINEN TOWEL PACK X10 5473

## (undated) DEVICE — LINEN FULL SHEET 5511

## (undated) DEVICE — SUCTION CANISTER STRAW 65652-008

## (undated) DEVICE — ESU GROUND PAD ADULT W/CORD E7507

## (undated) DEVICE — BARRIER SEPRAFILM 5X6" SINGLE SHEET 4301-02

## (undated) DEVICE — DRAIN JACKSON PRATT RESERVOIR 100ML SU130-1305

## (undated) DEVICE — SUCTION CANISTER MEDIVAC LINER 3000ML W/LID 65651-530

## (undated) DEVICE — SYR BULB IRRIG 50ML LATEX FREE 0035280

## (undated) DEVICE — LINEN HALF SHEET 5512

## (undated) DEVICE — DRSG STERI STRIP 1/2X4" R1547

## (undated) DEVICE — PREP SCRUB SOL EXIDINE 4% CHG 4OZ 29002-404

## (undated) DEVICE — BAG CLEAR TRASH 1.3M 39X33" P4040C

## (undated) DEVICE — SU VICRYL 2-0 TIE 12X18" J905T

## (undated) DEVICE — GLOVE PROTEXIS BLUE W/NEU-THERA 8.0  2D73EB80

## (undated) RX ORDER — DEXAMETHASONE SODIUM PHOSPHATE 4 MG/ML
INJECTION, SOLUTION INTRA-ARTICULAR; INTRALESIONAL; INTRAMUSCULAR; INTRAVENOUS; SOFT TISSUE
Status: DISPENSED
Start: 2017-04-21

## (undated) RX ORDER — GLYCOPYRROLATE 0.2 MG/ML
INJECTION INTRAMUSCULAR; INTRAVENOUS
Status: DISPENSED
Start: 2017-04-21

## (undated) RX ORDER — ACETAMINOPHEN 10 MG/ML
INJECTION, SOLUTION INTRAVENOUS
Status: DISPENSED
Start: 2017-04-21

## (undated) RX ORDER — KETOROLAC TROMETHAMINE 30 MG/ML
INJECTION, SOLUTION INTRAMUSCULAR; INTRAVENOUS
Status: DISPENSED
Start: 2017-04-21

## (undated) RX ORDER — PROPOFOL 10 MG/ML
INJECTION, EMULSION INTRAVENOUS
Status: DISPENSED
Start: 2017-04-21

## (undated) RX ORDER — EPHEDRINE SULFATE 50 MG/ML
INJECTION, SOLUTION INTRAMUSCULAR; INTRAVENOUS; SUBCUTANEOUS
Status: DISPENSED
Start: 2017-04-21

## (undated) RX ORDER — LIDOCAINE HYDROCHLORIDE 10 MG/ML
INJECTION, SOLUTION EPIDURAL; INFILTRATION; INTRACAUDAL; PERINEURAL
Status: DISPENSED
Start: 2017-04-21

## (undated) RX ORDER — ONDANSETRON 2 MG/ML
INJECTION INTRAMUSCULAR; INTRAVENOUS
Status: DISPENSED
Start: 2017-04-21

## (undated) RX ORDER — FENTANYL CITRATE 50 UG/ML
INJECTION, SOLUTION INTRAMUSCULAR; INTRAVENOUS
Status: DISPENSED
Start: 2017-04-21

## (undated) RX ORDER — CEFAZOLIN SODIUM 2 G/100ML
INJECTION, SOLUTION INTRAVENOUS
Status: DISPENSED
Start: 2017-04-21

## (undated) RX ORDER — PHENYLEPHRINE HCL IN 0.9% NACL 1 MG/10 ML
SYRINGE (ML) INTRAVENOUS
Status: DISPENSED
Start: 2017-04-21

## (undated) RX ORDER — BUPIVACAINE HYDROCHLORIDE AND EPINEPHRINE 2.5; 5 MG/ML; UG/ML
INJECTION, SOLUTION EPIDURAL; INFILTRATION; INTRACAUDAL; PERINEURAL
Status: DISPENSED
Start: 2017-04-21